# Patient Record
Sex: FEMALE | Race: AMERICAN INDIAN OR ALASKA NATIVE | NOT HISPANIC OR LATINO
[De-identification: names, ages, dates, MRNs, and addresses within clinical notes are randomized per-mention and may not be internally consistent; named-entity substitution may affect disease eponyms.]

---

## 2020-12-01 ENCOUNTER — APPOINTMENT (OUTPATIENT)
Dept: HUMAN REPRODUCTION | Facility: CLINIC | Age: 35
End: 2020-12-01
Payer: COMMERCIAL

## 2020-12-01 PROCEDURE — 99203 OFFICE O/P NEW LOW 30 MIN: CPT | Mod: 95

## 2021-01-29 ENCOUNTER — APPOINTMENT (OUTPATIENT)
Dept: HUMAN REPRODUCTION | Facility: CLINIC | Age: 36
End: 2021-01-29
Payer: COMMERCIAL

## 2021-01-29 PROCEDURE — 99213 OFFICE O/P EST LOW 20 MIN: CPT | Mod: 25

## 2021-01-29 PROCEDURE — 76830 TRANSVAGINAL US NON-OB: CPT

## 2021-01-29 PROCEDURE — 36415 COLL VENOUS BLD VENIPUNCTURE: CPT

## 2021-01-29 PROCEDURE — 99072 ADDL SUPL MATRL&STAF TM PHE: CPT

## 2021-02-03 ENCOUNTER — TRANSCRIPTION ENCOUNTER (OUTPATIENT)
Age: 36
End: 2021-02-03

## 2021-02-22 ENCOUNTER — APPOINTMENT (OUTPATIENT)
Dept: HUMAN REPRODUCTION | Facility: CLINIC | Age: 36
End: 2021-02-22
Payer: COMMERCIAL

## 2021-02-22 PROCEDURE — 99213 OFFICE O/P EST LOW 20 MIN: CPT | Mod: 95

## 2021-03-02 ENCOUNTER — APPOINTMENT (OUTPATIENT)
Dept: HUMAN REPRODUCTION | Facility: CLINIC | Age: 36
End: 2021-03-02

## 2021-03-04 ENCOUNTER — APPOINTMENT (OUTPATIENT)
Dept: RADIOLOGY | Facility: HOSPITAL | Age: 36
End: 2021-03-04

## 2021-03-04 ENCOUNTER — OUTPATIENT (OUTPATIENT)
Dept: OUTPATIENT SERVICES | Facility: HOSPITAL | Age: 36
LOS: 1 days | End: 2021-03-04
Payer: COMMERCIAL

## 2021-03-04 ENCOUNTER — RESULT REVIEW (OUTPATIENT)
Age: 36
End: 2021-03-04

## 2021-03-04 ENCOUNTER — APPOINTMENT (OUTPATIENT)
Dept: HUMAN REPRODUCTION | Facility: CLINIC | Age: 36
End: 2021-03-04
Payer: COMMERCIAL

## 2021-03-04 DIAGNOSIS — N97.9 FEMALE INFERTILITY, UNSPECIFIED: ICD-10-CM

## 2021-03-04 PROCEDURE — 74740 X-RAY FEMALE GENITAL TRACT: CPT

## 2021-03-04 PROCEDURE — 58340 CATHETER FOR HYSTEROGRAPHY: CPT

## 2021-03-04 PROCEDURE — 99214 OFFICE O/P EST MOD 30 MIN: CPT | Mod: 25

## 2021-03-04 PROCEDURE — 74740 X-RAY FEMALE GENITAL TRACT: CPT | Mod: 26,59

## 2021-03-08 ENCOUNTER — APPOINTMENT (OUTPATIENT)
Dept: HUMAN REPRODUCTION | Facility: CLINIC | Age: 36
End: 2021-03-08
Payer: COMMERCIAL

## 2021-03-08 PROCEDURE — 99214 OFFICE O/P EST MOD 30 MIN: CPT | Mod: 95

## 2021-03-11 ENCOUNTER — TRANSCRIPTION ENCOUNTER (OUTPATIENT)
Age: 36
End: 2021-03-11

## 2021-04-06 ENCOUNTER — APPOINTMENT (OUTPATIENT)
Dept: ULTRASOUND IMAGING | Facility: HOSPITAL | Age: 36
End: 2021-04-06
Payer: COMMERCIAL

## 2021-04-06 ENCOUNTER — OUTPATIENT (OUTPATIENT)
Dept: OUTPATIENT SERVICES | Facility: HOSPITAL | Age: 36
LOS: 1 days | End: 2021-04-06
Payer: COMMERCIAL

## 2021-04-06 ENCOUNTER — RESULT REVIEW (OUTPATIENT)
Age: 36
End: 2021-04-06

## 2021-04-06 DIAGNOSIS — N97.9 FEMALE INFERTILITY, UNSPECIFIED: ICD-10-CM

## 2021-04-06 PROCEDURE — 76831 ECHO EXAM UTERUS: CPT | Mod: 26

## 2021-04-06 PROCEDURE — 76831 ECHO EXAM UTERUS: CPT

## 2021-04-06 PROCEDURE — 58340 CATHETER FOR HYSTEROGRAPHY: CPT

## 2021-04-13 ENCOUNTER — APPOINTMENT (OUTPATIENT)
Dept: HUMAN REPRODUCTION | Facility: CLINIC | Age: 36
End: 2021-04-13
Payer: COMMERCIAL

## 2021-04-13 PROCEDURE — 99213 OFFICE O/P EST LOW 20 MIN: CPT | Mod: 95

## 2021-05-09 ENCOUNTER — FORM ENCOUNTER (OUTPATIENT)
Age: 36
End: 2021-05-09

## 2021-05-10 ENCOUNTER — FORM ENCOUNTER (OUTPATIENT)
Age: 36
End: 2021-05-10

## 2021-05-11 ENCOUNTER — APPOINTMENT (OUTPATIENT)
Dept: OBGYN | Facility: CLINIC | Age: 36
End: 2021-05-11
Payer: COMMERCIAL

## 2021-05-11 PROCEDURE — 99072 ADDL SUPL MATRL&STAF TM PHE: CPT

## 2021-05-11 PROCEDURE — 99243 OFF/OP CNSLTJ NEW/EST LOW 30: CPT

## 2021-07-21 ENCOUNTER — APPOINTMENT (OUTPATIENT)
Dept: OBGYN | Facility: CLINIC | Age: 36
End: 2021-07-21

## 2021-08-10 ENCOUNTER — OUTPATIENT (OUTPATIENT)
Dept: OUTPATIENT SERVICES | Facility: HOSPITAL | Age: 36
LOS: 1 days | End: 2021-08-10
Payer: COMMERCIAL

## 2021-08-10 VITALS
SYSTOLIC BLOOD PRESSURE: 114 MMHG | RESPIRATION RATE: 16 BRPM | OXYGEN SATURATION: 100 % | TEMPERATURE: 98 F | WEIGHT: 143.96 LBS | HEIGHT: 65 IN | DIASTOLIC BLOOD PRESSURE: 78 MMHG | HEART RATE: 71 BPM

## 2021-08-10 DIAGNOSIS — Z11.52 ENCOUNTER FOR SCREENING FOR COVID-19: ICD-10-CM

## 2021-08-10 DIAGNOSIS — N84.0 POLYP OF CORPUS UTERI: ICD-10-CM

## 2021-08-10 DIAGNOSIS — E03.9 HYPOTHYROIDISM, UNSPECIFIED: ICD-10-CM

## 2021-08-10 LAB
ANION GAP SERPL CALC-SCNC: 13 MMOL/L — SIGNIFICANT CHANGE UP (ref 5–17)
BLD GP AB SCN SERPL QL: NEGATIVE — SIGNIFICANT CHANGE UP
BUN SERPL-MCNC: 9 MG/DL — SIGNIFICANT CHANGE UP (ref 7–23)
CALCIUM SERPL-MCNC: 9.5 MG/DL — SIGNIFICANT CHANGE UP (ref 8.4–10.5)
CHLORIDE SERPL-SCNC: 105 MMOL/L — SIGNIFICANT CHANGE UP (ref 96–108)
CO2 SERPL-SCNC: 23 MMOL/L — SIGNIFICANT CHANGE UP (ref 22–31)
CREAT SERPL-MCNC: 0.61 MG/DL — SIGNIFICANT CHANGE UP (ref 0.5–1.3)
GLUCOSE SERPL-MCNC: 94 MG/DL — SIGNIFICANT CHANGE UP (ref 70–99)
HCT VFR BLD CALC: 40.9 % — SIGNIFICANT CHANGE UP (ref 34.5–45)
HGB BLD-MCNC: 12.5 G/DL — SIGNIFICANT CHANGE UP (ref 11.5–15.5)
MCHC RBC-ENTMCNC: 24.8 PG — LOW (ref 27–34)
MCHC RBC-ENTMCNC: 30.6 GM/DL — LOW (ref 32–36)
MCV RBC AUTO: 81.2 FL — SIGNIFICANT CHANGE UP (ref 80–100)
NRBC # BLD: 0 /100 WBCS — SIGNIFICANT CHANGE UP (ref 0–0)
PLATELET # BLD AUTO: 274 K/UL — SIGNIFICANT CHANGE UP (ref 150–400)
POTASSIUM SERPL-MCNC: 4.2 MMOL/L — SIGNIFICANT CHANGE UP (ref 3.5–5.3)
POTASSIUM SERPL-SCNC: 4.2 MMOL/L — SIGNIFICANT CHANGE UP (ref 3.5–5.3)
RBC # BLD: 5.04 M/UL — SIGNIFICANT CHANGE UP (ref 3.8–5.2)
RBC # FLD: 15.3 % — HIGH (ref 10.3–14.5)
RH IG SCN BLD-IMP: POSITIVE — SIGNIFICANT CHANGE UP
SARS-COV-2 RNA SPEC QL NAA+PROBE: SIGNIFICANT CHANGE UP
SODIUM SERPL-SCNC: 141 MMOL/L — SIGNIFICANT CHANGE UP (ref 135–145)
WBC # BLD: 9.12 K/UL — SIGNIFICANT CHANGE UP (ref 3.8–10.5)
WBC # FLD AUTO: 9.12 K/UL — SIGNIFICANT CHANGE UP (ref 3.8–10.5)

## 2021-08-10 PROCEDURE — 80048 BASIC METABOLIC PNL TOTAL CA: CPT

## 2021-08-10 PROCEDURE — 85027 COMPLETE CBC AUTOMATED: CPT

## 2021-08-10 PROCEDURE — U0003: CPT

## 2021-08-10 PROCEDURE — 86850 RBC ANTIBODY SCREEN: CPT

## 2021-08-10 PROCEDURE — U0005: CPT

## 2021-08-10 PROCEDURE — C9803: CPT

## 2021-08-10 PROCEDURE — G0463: CPT

## 2021-08-10 PROCEDURE — 86900 BLOOD TYPING SEROLOGIC ABO: CPT

## 2021-08-10 PROCEDURE — 86901 BLOOD TYPING SEROLOGIC RH(D): CPT

## 2021-08-10 RX ORDER — LIDOCAINE HCL 20 MG/ML
0.2 VIAL (ML) INJECTION ONCE
Refills: 0 | Status: DISCONTINUED | OUTPATIENT
Start: 2021-08-12 | End: 2021-08-12

## 2021-08-10 RX ORDER — SODIUM CHLORIDE 9 MG/ML
3 INJECTION INTRAMUSCULAR; INTRAVENOUS; SUBCUTANEOUS EVERY 8 HOURS
Refills: 0 | Status: DISCONTINUED | OUTPATIENT
Start: 2021-08-12 | End: 2021-08-12

## 2021-08-10 NOTE — H&P PST ADULT - HISTORY OF PRESENT ILLNESS
35 yr old female with PMH of  35 yr old female (LMP: 21)  with PMH of Hypothyroidism otherwise healthy. Imaging reveals uterine polyp. Pt denies dysmenorrhea or menorrhagia at this time. Pt evaluated by Dr. Hearn for a scheduled D&C, Diagnostic hysteroscopy, polypectomy on 21. Pt denies recent travel or sick contact.     **Covid swab on 8/10/21 at [facility]

## 2021-08-10 NOTE — H&P PST ADULT - NEGATIVE FEMALE-SPECIFIC SYMPTOMS
no irregular menses/no vaginal discharge/no dysmenorrhea/no menorrhagia/no spotting/no abnormal vaginal bleeding

## 2021-08-10 NOTE — H&P PST ADULT - ATTENDING COMMENTS
Pt consented for  D&C, Diagnostic hysteroscopy, polypectomy, poss operative hysteroscopy. All questions answered.

## 2021-08-10 NOTE — H&P PST ADULT - PROBLEM SELECTOR PLAN 1
-scheduled D&C diagnostic hysteroscopy, polypectomy on 8/12/21  -preop instructions given  -labs: CBC, BMP, T&S done in PST  -DOS: urine preg, ABO

## 2021-08-11 ENCOUNTER — TRANSCRIPTION ENCOUNTER (OUTPATIENT)
Age: 36
End: 2021-08-11

## 2021-08-12 ENCOUNTER — APPOINTMENT (OUTPATIENT)
Dept: OBGYN | Facility: HOSPITAL | Age: 36
End: 2021-08-12

## 2021-08-12 ENCOUNTER — RESULT REVIEW (OUTPATIENT)
Age: 36
End: 2021-08-12

## 2021-08-12 ENCOUNTER — OUTPATIENT (OUTPATIENT)
Dept: OUTPATIENT SERVICES | Facility: HOSPITAL | Age: 36
LOS: 1 days | End: 2021-08-12
Payer: COMMERCIAL

## 2021-08-12 VITALS
WEIGHT: 143.96 LBS | DIASTOLIC BLOOD PRESSURE: 79 MMHG | OXYGEN SATURATION: 100 % | HEART RATE: 78 BPM | RESPIRATION RATE: 16 BRPM | TEMPERATURE: 99 F | HEIGHT: 65 IN | SYSTOLIC BLOOD PRESSURE: 118 MMHG

## 2021-08-12 VITALS
RESPIRATION RATE: 16 BRPM | SYSTOLIC BLOOD PRESSURE: 115 MMHG | HEART RATE: 92 BPM | DIASTOLIC BLOOD PRESSURE: 64 MMHG | OXYGEN SATURATION: 100 % | TEMPERATURE: 97 F

## 2021-08-12 DIAGNOSIS — N84.0 POLYP OF CORPUS UTERI: ICD-10-CM

## 2021-08-12 LAB
BLD GP AB SCN SERPL QL: NEGATIVE — SIGNIFICANT CHANGE UP
HCG UR QL: NEGATIVE — SIGNIFICANT CHANGE UP
RH IG SCN BLD-IMP: POSITIVE — SIGNIFICANT CHANGE UP

## 2021-08-12 PROCEDURE — 86850 RBC ANTIBODY SCREEN: CPT

## 2021-08-12 PROCEDURE — 86900 BLOOD TYPING SEROLOGIC ABO: CPT

## 2021-08-12 PROCEDURE — 86901 BLOOD TYPING SEROLOGIC RH(D): CPT

## 2021-08-12 PROCEDURE — 58558 HYSTEROSCOPY BIOPSY: CPT

## 2021-08-12 PROCEDURE — 88305 TISSUE EXAM BY PATHOLOGIST: CPT

## 2021-08-12 PROCEDURE — 81025 URINE PREGNANCY TEST: CPT

## 2021-08-12 PROCEDURE — 88305 TISSUE EXAM BY PATHOLOGIST: CPT | Mod: 26

## 2021-08-12 RX ORDER — ONDANSETRON 8 MG/1
4 TABLET, FILM COATED ORAL ONCE
Refills: 0 | Status: DISCONTINUED | OUTPATIENT
Start: 2021-08-12 | End: 2021-08-12

## 2021-08-12 RX ORDER — SODIUM CHLORIDE 9 MG/ML
1000 INJECTION, SOLUTION INTRAVENOUS
Refills: 0 | Status: DISCONTINUED | OUTPATIENT
Start: 2021-08-12 | End: 2021-08-26

## 2021-08-12 RX ORDER — LEVOTHYROXINE SODIUM 125 MCG
1 TABLET ORAL
Qty: 0 | Refills: 0 | DISCHARGE

## 2021-08-12 RX ORDER — CELECOXIB 200 MG/1
200 CAPSULE ORAL ONCE
Refills: 0 | Status: COMPLETED | OUTPATIENT
Start: 2021-08-12 | End: 2021-08-12

## 2021-08-12 RX ORDER — HYDROMORPHONE HYDROCHLORIDE 2 MG/ML
0.5 INJECTION INTRAMUSCULAR; INTRAVENOUS; SUBCUTANEOUS
Refills: 0 | Status: DISCONTINUED | OUTPATIENT
Start: 2021-08-12 | End: 2021-08-12

## 2021-08-12 RX ORDER — FAMOTIDINE 10 MG/ML
20 INJECTION INTRAVENOUS ONCE
Refills: 0 | Status: COMPLETED | OUTPATIENT
Start: 2021-08-12 | End: 2021-08-12

## 2021-08-12 RX ORDER — SODIUM CHLORIDE 9 MG/ML
1000 INJECTION, SOLUTION INTRAVENOUS
Refills: 0 | Status: DISCONTINUED | OUTPATIENT
Start: 2021-08-12 | End: 2021-08-12

## 2021-08-12 RX ORDER — ACETAMINOPHEN 500 MG
975 TABLET ORAL ONCE
Refills: 0 | Status: COMPLETED | OUTPATIENT
Start: 2021-08-12 | End: 2021-08-12

## 2021-08-12 RX ADMIN — CELECOXIB 200 MILLIGRAM(S): 200 CAPSULE ORAL at 09:14

## 2021-08-12 RX ADMIN — Medication 975 MILLIGRAM(S): at 09:14

## 2021-08-12 RX ADMIN — SODIUM CHLORIDE 125 MILLILITER(S): 9 INJECTION, SOLUTION INTRAVENOUS at 15:49

## 2021-08-12 RX ADMIN — FAMOTIDINE 20 MILLIGRAM(S): 10 INJECTION INTRAVENOUS at 09:14

## 2021-08-12 NOTE — ASU DISCHARGE PLAN (ADULT/PEDIATRIC) - CALL YOUR DOCTOR IF YOU HAVE ANY OF THE FOLLOWING:
Bleeding that does not stop/Swelling that gets worse/Pain not relieved by Medications/Fever greater than (need to indicate Fahrenheit or Celsius)/Wound/Surgical Site with redness, or foul smelling discharge or pus/Unable to urinate/Excessive diarrhea/Inability to tolerate liquids or foods

## 2021-08-12 NOTE — PROGRESS NOTE ADULT - SUBJECTIVE AND OBJECTIVE BOX
POST-OP CHECK  PA Note    Allergies    No Known Allergies    Intolerances        S: Pt awake and alert resting comfortaby in bed     Pain controlled. Pt denies N/V, SOB, CP, palpitations.   neg Flatus  neg PO     O:   T(C): 36.4 (08-12-21 @ 15:10), Max: 36.4 (08-12-21 @ 15:10)  HR: 84 (08-12-21 @ 16:00) (78 - 88)  BP: 126/76 (08-12-21 @ 16:00) (121/75 - 131/68)  RR: 16 (08-12-21 @ 16:00) (15 - 17)  SpO2: 100% (08-12-21 @ 16:00) (100% - 100%)  Wt(kg): --  I&O's Summary                       OR           PACU  (I)                              IVF LR@125  (O)  EBL    CV: RRR  Lungs: CTA B/L  Abd: +BS, soft, appropriately tender  Ext: SCDs in place, Neg Homans B/L

## 2021-08-12 NOTE — PROGRESS NOTE ADULT - ASSESSMENT
A/P: 35y Female S/P D&C hysterocscopy polypectomy 300/10  with PMHx of   PAST MEDICAL & SURGICAL HISTORY:  Hypothyroidism    No significant past surgical history        -Neuro - AAO x 3, Pain well controlled   -Heme - hemodynamically stable  -CV - asymptomatic, CBC  -Pulm - encourage IS, O2sat   -GI - Diet-  Regular  Advance as Toelrated  -DVT prophylaxis - SCDs in place, encourage ambulation  -Meds:   HYDROmorphone  Injectable 0.5 milliGRAM(s) IV Push every 10 minutes PRN  lactated ringers. 1000 milliLiter(s) IV Continuous <Continuous>  ondansetron Injectable 4 milliGRAM(s) IV Push once PRN    -Dispo: stable for discharge from PACU, once meeting all PACU milestones

## 2021-08-12 NOTE — BRIEF OPERATIVE NOTE - OPERATION/FINDINGS
Normal external female genitalia  Anteverted uterus on EUA, 8 weeks size.   Ostia visualized b/l.  Left lateral polyp within normal uterine cavity with proliferative endometrial tissue.

## 2021-08-12 NOTE — ASU DISCHARGE PLAN (ADULT/PEDIATRIC) - ACTIVITY LEVEL
No excercise/No sports/gym/Elevate extremity/Nothing per rectum/Nothing per vagina/No tub baths/No douching/No tampons/No intercourse

## 2021-08-12 NOTE — ASU DISCHARGE PLAN (ADULT/PEDIATRIC) - ASU DC SPECIAL INSTRUCTIONSFT
Postoperative Instructions      Pain control     For pain control, take the followin. Motrin 600mg four times a day, take with food.  2. Add Tylenol 975 four times a day, alternated with motrin.    Motrin and Tylenol can be obtained over the counter.    Postoperative restrictions   Do not drive or make important decisions for 24 hours after anesthesia. Nothing in the vagina (tampons, sexual intercourse), no tub baths, pools or hot tubs for 2 weeks (showers are ok!). No lifting anything heavier than 15 lbs, no strenuous exercise for 2 weeks after surgery.        Vaginal bleeding   Spotting and intermittent passage of blood clots per vagina is normal in first few weeks after surgery.  If you are soaking 1 pad per hour, that is NOT normal and you should notify Dr. Hearn office and seek medical attention right away.      Signs of Infection    Call the office and/or come to the emergency room for any of the following: foul smelling vaginal discharge, fever over 100.4F, abdominal pain or cramping that does not get better with over the counter medications, nausea/vomiting (especially if you become unable to tolerate oral intake), inability to urinate. Any of these could be signs that you may be developing an infection requiring antibiotics.      Follow Up  Call Dr. Hearn office to schedule a postoperative appointment in 2 weeks.

## 2021-08-12 NOTE — BRIEF OPERATIVE NOTE - NSICDXBRIEFPROCEDURE_GEN_ALL_CORE_FT
PROCEDURES:  Hysteroscopy with dilation and curettage of uterus 12-Aug-2021 15:31:03  Melany Carlson  Polypectomy, uterus 12-Aug-2021 15:31:13  Melany Carlson

## 2021-08-12 NOTE — ASU DISCHARGE PLAN (ADULT/PEDIATRIC) - CARE PROVIDER_API CALL
Boom Hearn)  Obstetrics and Gynecology  [address] [place] [address], [address]  [place] [address]  Phone: [phone]  Fax: [phone]  Follow Up Time: 2 weeks

## 2021-08-18 ENCOUNTER — FORM ENCOUNTER (OUTPATIENT)
Age: 36
End: 2021-08-18

## 2021-08-18 LAB — SURGICAL PATHOLOGY STUDY: SIGNIFICANT CHANGE UP

## 2021-08-19 PROBLEM — E03.9 HYPOTHYROIDISM, UNSPECIFIED: Chronic | Status: ACTIVE | Noted: 2021-08-10

## 2021-08-31 ENCOUNTER — FORM ENCOUNTER (OUTPATIENT)
Age: 36
End: 2021-08-31

## 2021-09-01 ENCOUNTER — FORM ENCOUNTER (OUTPATIENT)
Age: 36
End: 2021-09-01

## 2021-09-01 ENCOUNTER — APPOINTMENT (OUTPATIENT)
Dept: OBGYN | Facility: CLINIC | Age: 36
End: 2021-09-01
Payer: COMMERCIAL

## 2021-09-01 PROCEDURE — 99212 OFFICE O/P EST SF 10 MIN: CPT

## 2021-09-20 ENCOUNTER — APPOINTMENT (OUTPATIENT)
Dept: HUMAN REPRODUCTION | Facility: CLINIC | Age: 36
End: 2021-09-20
Payer: COMMERCIAL

## 2021-09-20 PROCEDURE — 99214 OFFICE O/P EST MOD 30 MIN: CPT | Mod: 95

## 2021-10-01 ENCOUNTER — APPOINTMENT (OUTPATIENT)
Dept: HUMAN REPRODUCTION | Facility: CLINIC | Age: 36
End: 2021-10-01
Payer: COMMERCIAL

## 2021-10-01 PROCEDURE — 99213 OFFICE O/P EST LOW 20 MIN: CPT | Mod: 25

## 2021-10-01 PROCEDURE — 76830 TRANSVAGINAL US NON-OB: CPT

## 2021-10-01 PROCEDURE — 36415 COLL VENOUS BLD VENIPUNCTURE: CPT

## 2022-01-11 ENCOUNTER — APPOINTMENT (OUTPATIENT)
Dept: HUMAN REPRODUCTION | Facility: CLINIC | Age: 37
End: 2022-01-11
Payer: COMMERCIAL

## 2022-01-11 PROCEDURE — 99214 OFFICE O/P EST MOD 30 MIN: CPT | Mod: 95

## 2022-01-12 ENCOUNTER — NON-APPOINTMENT (OUTPATIENT)
Age: 37
End: 2022-01-12

## 2022-01-12 DIAGNOSIS — Z98.890 OTHER SPECIFIED POSTPROCEDURAL STATES: ICD-10-CM

## 2022-01-12 DIAGNOSIS — N84.0 POLYP OF CORPUS UTERI: ICD-10-CM

## 2022-01-18 ENCOUNTER — APPOINTMENT (OUTPATIENT)
Dept: OBGYN | Facility: CLINIC | Age: 37
End: 2022-01-18
Payer: COMMERCIAL

## 2022-01-18 VITALS
SYSTOLIC BLOOD PRESSURE: 127 MMHG | BODY MASS INDEX: 24.98 KG/M2 | WEIGHT: 141 LBS | HEIGHT: 63 IN | DIASTOLIC BLOOD PRESSURE: 80 MMHG

## 2022-01-18 DIAGNOSIS — Z01.419 ENCOUNTER FOR GYNECOLOGICAL EXAMINATION (GENERAL) (ROUTINE) W/OUT ABNORMAL FINDINGS: ICD-10-CM

## 2022-01-18 PROCEDURE — 99395 PREV VISIT EST AGE 18-39: CPT

## 2022-01-18 NOTE — END OF VISIT
[FreeTextEntry3] : I, Adrianna Saldaña, acted solely as a scribe for Dr. Whit Cam MD., on 01/18/2022.\par \par All medical record entries made by the scribe were at my, Dr. Whit Cam MD., direction and personally dictated by me on 01/18/2022. I have personally reviewed the chart and agree that the record accurately reflects my personal performance of the history, physical exam, assessment and plan.\par

## 2022-01-18 NOTE — HISTORY OF PRESENT ILLNESS
[FreeTextEntry1] : MARLY GARCIA is a 36 year old presenting for annual. Pt is doing well and has no complaints. Still following up with reproductive specialist, possibly going with IUI.

## 2022-01-18 NOTE — PLAN
[FreeTextEntry1] : MARLY GARCIA is a 36 year old presenting for annual.\par -Pap/HPV was done today \par -BSE\par -RTO 1 year

## 2022-01-19 LAB — HPV HIGH+LOW RISK DNA PNL CVX: NOT DETECTED

## 2022-01-25 ENCOUNTER — APPOINTMENT (OUTPATIENT)
Dept: HUMAN REPRODUCTION | Facility: CLINIC | Age: 37
End: 2022-01-25
Payer: COMMERCIAL

## 2022-01-25 PROCEDURE — 99214 OFFICE O/P EST MOD 30 MIN: CPT | Mod: 25

## 2022-01-25 PROCEDURE — 36415 COLL VENOUS BLD VENIPUNCTURE: CPT

## 2022-01-26 LAB — CYTOLOGY CVX/VAG DOC THIN PREP: ABNORMAL

## 2022-03-25 ENCOUNTER — APPOINTMENT (OUTPATIENT)
Dept: OBGYN | Facility: CLINIC | Age: 37
End: 2022-03-25
Payer: COMMERCIAL

## 2022-03-25 VITALS
WEIGHT: 140 LBS | DIASTOLIC BLOOD PRESSURE: 80 MMHG | HEIGHT: 63 IN | BODY MASS INDEX: 24.8 KG/M2 | SYSTOLIC BLOOD PRESSURE: 123 MMHG

## 2022-03-25 PROCEDURE — 99213 OFFICE O/P EST LOW 20 MIN: CPT | Mod: 25

## 2022-03-25 PROCEDURE — 76857 US EXAM PELVIC LIMITED: CPT

## 2022-03-25 PROCEDURE — 36415 COLL VENOUS BLD VENIPUNCTURE: CPT

## 2022-03-26 LAB
HCG SERPL-MCNC: ABNORMAL MIU/ML
PROGEST SERPL-MCNC: 26.4 NG/ML

## 2022-03-26 NOTE — HISTORY OF PRESENT ILLNESS
[N] : Patient does not use contraception [Y] : Positive pregnancy history [LMPDate] : 2/11/22 [PGxTotal] : 1 [PGHxFullTerm] : 0 [PGHxAbortions] : 1 [[facility]Living] : 0 [PGHxABSpont] : 1 [N/A] : pregnancy not applicable [Thyroid disorder] : thyroid disorder [TextBox_6] : 2/11/22 [TextBox_13] : 22-11 [Light Bleeding] : light bleeding [Regular Cycle Intervals] : periods have been regular [HCG+: ___(Date)] : a positive HCG was recorded on [unfilled] [FreeTextEntry1] : 2/11/22 [Currently Active] : currently active [Men] : men [No] : No [Patient refuses STI testing] : Patient refuses STI testing

## 2022-03-26 NOTE — END OF VISIT
[FreeTextEntry3] : I, Maricel Braden, acted as a scribe on behalf of Taya Garcia NP on 03/25/2022.\par \par All medical entries made by the scribe were at my, Taya Garcia NP, direction and personally dictated by me on 03/25/2022 . I have reviewed the chart and agree that the record accurately reflects my personal performance of the history, physical exam, assessment and plan. I have also personally directed, reviewed, and agreed with the chart.

## 2022-03-26 NOTE — PHYSICAL EXAM
[Appropriately responsive] : appropriately responsive [Alert] : alert [No Acute Distress] : no acute distress [No Lymphadenopathy] : no lymphadenopathy [Soft] : soft [Non-tender] : non-tender [Non-distended] : non-distended [No HSM] : No HSM [No Lesions] : no lesions [No Mass] : no mass [Oriented x3] : oriented x3 [Labia Majora] : normal [Labia Minora] : normal [Normal] : normal [Uterine Adnexae] : normal [Chaperone Declined] : Patient declined chaperone [Scant] : There was scant vaginal bleeding [Declined] : Patient declined rectal exam [FreeTextEntry4] : brown d/c c/w old blood

## 2022-03-26 NOTE — PLAN
[FreeTextEntry1] : 35 y/o LMP 2022   presents for an acute office visit today regarding vaginal bleeding on Wednesday 3/23 and positive urine pregnancy test.  \par \par Early iup with prior bleeding\par -vaginal bleeding now resolved/ brown discharge noted c/w old blood. \par - Intrauterine pregnancy was confirmed today at 6 weeks and 0 days per LMP and early sono today; TVUS shows single small GS at fundal region with +YS and small FP measuring 6w0d by CRL. No obvious FH today but +flicker noted.  \par -early pregnancy precautions reviewed. \par - Beta-HCG, progesterone, and type in screen were collected today in office. \par -pelvic rest advised\par \par  Follow up with Dr. Cam for regularly scheduled amenorrhea visit/confirm viable pregnancy\par \par During this visit 30 minutes were spent face-to-face with greater than 50% of this time dedicated to counseling.\par \par \par

## 2022-03-26 NOTE — COUNSELING
[Preconception Care/ Fertility options] : preconception care, fertility options [Pregnancy Options] : pregnancy options [Vitamins/Supplements] : vitamins/supplements [Confidentiality] : confidentiality [Lab Results] : lab results [Other ___] : [unfilled]

## 2022-03-28 LAB — ABO + RH PNL BLD: NORMAL

## 2022-04-04 ENCOUNTER — LABORATORY RESULT (OUTPATIENT)
Age: 37
End: 2022-04-04

## 2022-04-04 ENCOUNTER — APPOINTMENT (OUTPATIENT)
Dept: OBGYN | Facility: CLINIC | Age: 37
End: 2022-04-04
Payer: COMMERCIAL

## 2022-04-04 VITALS
SYSTOLIC BLOOD PRESSURE: 122 MMHG | HEIGHT: 63 IN | DIASTOLIC BLOOD PRESSURE: 78 MMHG | WEIGHT: 141 LBS | BODY MASS INDEX: 24.98 KG/M2

## 2022-04-04 PROCEDURE — 0500F INITIAL PRENATAL CARE VISIT: CPT

## 2022-04-04 PROCEDURE — 36415 COLL VENOUS BLD VENIPUNCTURE: CPT

## 2022-04-04 PROCEDURE — 76817 TRANSVAGINAL US OBSTETRIC: CPT

## 2022-04-05 ENCOUNTER — NON-APPOINTMENT (OUTPATIENT)
Age: 37
End: 2022-04-05

## 2022-04-05 LAB
ABO + RH PNL BLD: NORMAL
B19V IGG SER QL IA: 3.93 INDEX
B19V IGG+IGM SER-IMP: NORMAL
B19V IGG+IGM SER-IMP: POSITIVE
B19V IGM FLD-ACNC: 0.54 INDEX
B19V IGM SER-ACNC: NEGATIVE
BASOPHILS # BLD AUTO: 0.06 K/UL
BASOPHILS NFR BLD AUTO: 0.6 %
C TRACH RRNA SPEC QL NAA+PROBE: NOT DETECTED
EOSINOPHIL # BLD AUTO: 0.09 K/UL
EOSINOPHIL NFR BLD AUTO: 0.9 %
ESTIMATED AVERAGE GLUCOSE: 105 MG/DL
HBA1C MFR BLD HPLC: 5.3 %
HBV SURFACE AG SER QL: NONREACTIVE
HCT VFR BLD CALC: 40.9 %
HGB A MFR BLD: 97.4 %
HGB A2 MFR BLD: 2.6 %
HGB BLD-MCNC: 12.3 G/DL
HGB FRACT BLD-IMP: NORMAL
HIV1+2 AB SPEC QL IA.RAPID: NONREACTIVE
IMM GRANULOCYTES NFR BLD AUTO: 0.5 %
LEAD BLD-MCNC: <1 UG/DL
LYMPHOCYTES # BLD AUTO: 2.24 K/UL
LYMPHOCYTES NFR BLD AUTO: 23.3 %
MAN DIFF?: NORMAL
MCHC RBC-ENTMCNC: 24.8 PG
MCHC RBC-ENTMCNC: 30.1 GM/DL
MCV RBC AUTO: 82.6 FL
MEV IGG FLD QL IA: 97.5 AU/ML
MEV IGG+IGM SER-IMP: POSITIVE
MONOCYTES # BLD AUTO: 0.62 K/UL
MONOCYTES NFR BLD AUTO: 6.5 %
MUV AB SER-ACNC: POSITIVE
MUV IGG SER QL IA: 159 AU/ML
N GONORRHOEA RRNA SPEC QL NAA+PROBE: NOT DETECTED
NEUTROPHILS # BLD AUTO: 6.55 K/UL
NEUTROPHILS NFR BLD AUTO: 68.2 %
PLATELET # BLD AUTO: 298 K/UL
RBC # BLD: 4.95 M/UL
RBC # FLD: 16.1 %
RUBV IGG FLD-ACNC: 16 INDEX
RUBV IGG FLD-ACNC: 16 INDEX
RUBV IGG SER-IMP: POSITIVE
RUBV IGG SER-IMP: POSITIVE
SOURCE AMPLIFICATION: NORMAL
T PALLIDUM AB SER QL IA: NEGATIVE
VZV AB TITR SER: POSITIVE
VZV IGG SER IF-ACNC: 808.8 INDEX
WBC # FLD AUTO: 9.61 K/UL

## 2022-04-06 ENCOUNTER — NON-APPOINTMENT (OUTPATIENT)
Age: 37
End: 2022-04-06

## 2022-04-06 LAB
25(OH)D3 SERPL-MCNC: 21.7 NG/ML
BACTERIA UR CULT: NORMAL
TSH SERPL-ACNC: 9.91 UIU/ML

## 2022-05-05 ENCOUNTER — APPOINTMENT (OUTPATIENT)
Dept: OBGYN | Facility: CLINIC | Age: 37
End: 2022-05-05
Payer: COMMERCIAL

## 2022-05-05 DIAGNOSIS — D64.9 ANEMIA, UNSPECIFIED: ICD-10-CM

## 2022-05-05 PROCEDURE — 0502F SUBSEQUENT PRENATAL CARE: CPT

## 2022-05-05 PROCEDURE — 36415 COLL VENOUS BLD VENIPUNCTURE: CPT

## 2022-05-11 ENCOUNTER — APPOINTMENT (OUTPATIENT)
Dept: OBGYN | Facility: CLINIC | Age: 37
End: 2022-05-11
Payer: COMMERCIAL

## 2022-05-11 ENCOUNTER — ASOB RESULT (OUTPATIENT)
Age: 37
End: 2022-05-11

## 2022-05-11 VITALS
SYSTOLIC BLOOD PRESSURE: 121 MMHG | HEIGHT: 63 IN | OXYGEN SATURATION: 100 % | HEART RATE: 85 BPM | WEIGHT: 146 LBS | BODY MASS INDEX: 25.87 KG/M2 | DIASTOLIC BLOOD PRESSURE: 72 MMHG | RESPIRATION RATE: 15 BRPM

## 2022-05-11 PROCEDURE — 36415 COLL VENOUS BLD VENIPUNCTURE: CPT

## 2022-05-11 PROCEDURE — 76813 OB US NUCHAL MEAS 1 GEST: CPT

## 2022-05-11 PROCEDURE — 0502F SUBSEQUENT PRENATAL CARE: CPT

## 2022-05-11 PROCEDURE — 76801 OB US < 14 WKS SINGLE FETUS: CPT | Mod: 59

## 2022-05-18 ENCOUNTER — NON-APPOINTMENT (OUTPATIENT)
Age: 37
End: 2022-05-18

## 2022-05-18 LAB
1ST TRIMESTER DATA: NORMAL
ADDENDUM DOC: NORMAL
AFP PNL SERPL: NORMAL
AFP SERPL-ACNC: NORMAL
CLINICAL BIOCHEMIST REVIEW: NORMAL
FREE BETA HCG 1ST TRIMESTER: NORMAL
Lab: NORMAL
NASAL BONE: PRESENT
NOTES NTD: NORMAL
NT: NORMAL
PAPP-A SERPL-ACNC: NORMAL
TRISOMY 18/3: NORMAL

## 2022-05-19 ENCOUNTER — NON-APPOINTMENT (OUTPATIENT)
Age: 37
End: 2022-05-19

## 2022-05-25 ENCOUNTER — NON-APPOINTMENT (OUTPATIENT)
Age: 37
End: 2022-05-25

## 2022-05-26 LAB
BACTERIA UR CULT: NORMAL
BASOPHILS # BLD AUTO: 0.06 K/UL
BASOPHILS NFR BLD AUTO: 0.5 %
EOSINOPHIL # BLD AUTO: 0.09 K/UL
EOSINOPHIL NFR BLD AUTO: 0.8 %
HCT VFR BLD CALC: 39.4 %
HGB BLD-MCNC: 12.5 G/DL
IMM GRANULOCYTES NFR BLD AUTO: 0.3 %
LYMPHOCYTES # BLD AUTO: 1.77 K/UL
LYMPHOCYTES NFR BLD AUTO: 15.3 %
MAN DIFF?: NORMAL
MCHC RBC-ENTMCNC: 25 PG
MCHC RBC-ENTMCNC: 31.7 GM/DL
MCV RBC AUTO: 78.6 FL
MONOCYTES # BLD AUTO: 0.71 K/UL
MONOCYTES NFR BLD AUTO: 6.1 %
NEUTROPHILS # BLD AUTO: 8.9 K/UL
NEUTROPHILS NFR BLD AUTO: 77 %
PLATELET # BLD AUTO: 266 K/UL
RBC # BLD: 5.01 M/UL
RBC # FLD: 16.3 %
WBC # FLD AUTO: 11.57 K/UL

## 2022-05-27 ENCOUNTER — NON-APPOINTMENT (OUTPATIENT)
Age: 37
End: 2022-05-27

## 2022-06-08 ENCOUNTER — APPOINTMENT (OUTPATIENT)
Dept: OBGYN | Facility: CLINIC | Age: 37
End: 2022-06-08
Payer: COMMERCIAL

## 2022-06-08 PROCEDURE — 0502F SUBSEQUENT PRENATAL CARE: CPT

## 2022-06-08 PROCEDURE — 36415 COLL VENOUS BLD VENIPUNCTURE: CPT

## 2022-06-17 LAB
1ST TRIMESTER DATA: NORMAL
2ND TRIMESTER DATA: NORMAL
AFP PNL SERPL: NORMAL
AFP SERPL-ACNC: NORMAL
AFP SERPL-ACNC: NORMAL
B-HCG FREE SERPL-MCNC: NORMAL
CLINICAL BIOCHEMIST REVIEW: NORMAL
FREE BETA HCG 1ST TRIMESTER: NORMAL
INHIBIN A SERPL-MCNC: NORMAL
NASAL BONE: PRESENT
NOTES NTD: NORMAL
NT: NORMAL
PAPP-A SERPL-ACNC: NORMAL
U ESTRIOL SERPL-SCNC: NORMAL

## 2022-06-27 ENCOUNTER — NON-APPOINTMENT (OUTPATIENT)
Age: 37
End: 2022-06-27

## 2022-07-01 ENCOUNTER — ASOB RESULT (OUTPATIENT)
Age: 37
End: 2022-07-01

## 2022-07-01 ENCOUNTER — NON-APPOINTMENT (OUTPATIENT)
Age: 37
End: 2022-07-01

## 2022-07-01 ENCOUNTER — APPOINTMENT (OUTPATIENT)
Dept: ANTEPARTUM | Facility: CLINIC | Age: 37
End: 2022-07-01

## 2022-07-01 PROCEDURE — 76811 OB US DETAILED SNGL FETUS: CPT

## 2022-07-08 ENCOUNTER — APPOINTMENT (OUTPATIENT)
Dept: ANTEPARTUM | Facility: CLINIC | Age: 37
End: 2022-07-08

## 2022-07-08 ENCOUNTER — ASOB RESULT (OUTPATIENT)
Age: 37
End: 2022-07-08

## 2022-07-08 PROCEDURE — 76816 OB US FOLLOW-UP PER FETUS: CPT

## 2022-07-19 ENCOUNTER — NON-APPOINTMENT (OUTPATIENT)
Age: 37
End: 2022-07-19

## 2022-07-19 ENCOUNTER — APPOINTMENT (OUTPATIENT)
Dept: OBGYN | Facility: CLINIC | Age: 37
End: 2022-07-19

## 2022-07-19 PROCEDURE — 0502F SUBSEQUENT PRENATAL CARE: CPT

## 2022-08-23 ENCOUNTER — APPOINTMENT (OUTPATIENT)
Dept: OBGYN | Facility: CLINIC | Age: 37
End: 2022-08-23

## 2022-08-23 ENCOUNTER — ASOB RESULT (OUTPATIENT)
Age: 37
End: 2022-08-23

## 2022-08-23 ENCOUNTER — NON-APPOINTMENT (OUTPATIENT)
Age: 37
End: 2022-08-23

## 2022-08-23 VITALS
SYSTOLIC BLOOD PRESSURE: 117 MMHG | HEART RATE: 92 BPM | HEIGHT: 63 IN | DIASTOLIC BLOOD PRESSURE: 72 MMHG | BODY MASS INDEX: 26.75 KG/M2 | WEIGHT: 151 LBS

## 2022-08-23 DIAGNOSIS — E03.9 ENDOCRINE, NUTRITIONAL AND METABOLIC DISEASES COMPLICATING PREGNANCY, SECOND TRIMESTER: ICD-10-CM

## 2022-08-23 DIAGNOSIS — O99.282 ENDOCRINE, NUTRITIONAL AND METABOLIC DISEASES COMPLICATING PREGNANCY, SECOND TRIMESTER: ICD-10-CM

## 2022-08-23 DIAGNOSIS — Z13.0 ENCOUNTER FOR SCREENING FOR DISEASES OF THE BLOOD AND BLOOD-FORMING ORGANS AND CERTAIN DISORDERS INVOLVING THE IMMUNE MECHANISM: ICD-10-CM

## 2022-08-23 DIAGNOSIS — Z11.3 ENCOUNTER FOR SCREENING FOR INFECTIONS WITH A PREDOMINANTLY SEXUAL MODE OF TRANSMISSION: ICD-10-CM

## 2022-08-23 LAB
25(OH)D3 SERPL-MCNC: 23.6 NG/ML
BASOPHILS # BLD AUTO: 0.04 K/UL
BASOPHILS NFR BLD AUTO: 0.5 %
EOSINOPHIL # BLD AUTO: 0.08 K/UL
EOSINOPHIL NFR BLD AUTO: 0.9 %
GLUCOSE 1H P 50 G GLC PO SERPL-MCNC: 109 MG/DL
HCT VFR BLD CALC: 35 %
HGB BLD-MCNC: 10.9 G/DL
HIV1+2 AB SPEC QL IA.RAPID: NONREACTIVE
IMM GRANULOCYTES NFR BLD AUTO: 0.7 %
LYMPHOCYTES # BLD AUTO: 1.56 K/UL
LYMPHOCYTES NFR BLD AUTO: 18.2 %
MAN DIFF?: NORMAL
MCHC RBC-ENTMCNC: 25.8 PG
MCHC RBC-ENTMCNC: 31.1 GM/DL
MCV RBC AUTO: 82.7 FL
MONOCYTES # BLD AUTO: 0.46 K/UL
MONOCYTES NFR BLD AUTO: 5.4 %
NEUTROPHILS # BLD AUTO: 6.39 K/UL
NEUTROPHILS NFR BLD AUTO: 74.3 %
PLATELET # BLD AUTO: 262 K/UL
RBC # BLD: 4.23 M/UL
RBC # FLD: 16 %
WBC # FLD AUTO: 8.59 K/UL

## 2022-08-23 PROCEDURE — 76816 OB US FOLLOW-UP PER FETUS: CPT

## 2022-08-23 PROCEDURE — 36415 COLL VENOUS BLD VENIPUNCTURE: CPT

## 2022-08-23 PROCEDURE — 0502F SUBSEQUENT PRENATAL CARE: CPT

## 2022-08-24 LAB
BLD GP AB SCN SERPL QL: NORMAL
HCV AB SER QL: NONREACTIVE
HCV S/CO RATIO: 0.09 S/CO
T PALLIDUM AB SER QL IA: NEGATIVE

## 2022-09-12 ENCOUNTER — NON-APPOINTMENT (OUTPATIENT)
Age: 37
End: 2022-09-12

## 2022-09-13 ENCOUNTER — APPOINTMENT (OUTPATIENT)
Dept: OBGYN | Facility: CLINIC | Age: 37
End: 2022-09-13

## 2022-09-13 DIAGNOSIS — Z23 ENCOUNTER FOR IMMUNIZATION: ICD-10-CM

## 2022-09-13 PROCEDURE — 90471 IMMUNIZATION ADMIN: CPT

## 2022-09-13 PROCEDURE — 0502F SUBSEQUENT PRENATAL CARE: CPT

## 2022-09-13 PROCEDURE — 90715 TDAP VACCINE 7 YRS/> IM: CPT

## 2022-09-30 ENCOUNTER — NON-APPOINTMENT (OUTPATIENT)
Age: 37
End: 2022-09-30

## 2022-09-30 ENCOUNTER — APPOINTMENT (OUTPATIENT)
Dept: OBGYN | Facility: CLINIC | Age: 37
End: 2022-09-30

## 2022-09-30 ENCOUNTER — ASOB RESULT (OUTPATIENT)
Age: 37
End: 2022-09-30

## 2022-09-30 PROCEDURE — 76816 OB US FOLLOW-UP PER FETUS: CPT | Mod: 59

## 2022-09-30 PROCEDURE — 76818 FETAL BIOPHYS PROFILE W/NST: CPT

## 2022-09-30 PROCEDURE — 0502F SUBSEQUENT PRENATAL CARE: CPT

## 2022-10-10 ENCOUNTER — NON-APPOINTMENT (OUTPATIENT)
Age: 37
End: 2022-10-10

## 2022-10-10 ENCOUNTER — APPOINTMENT (OUTPATIENT)
Dept: OBGYN | Facility: CLINIC | Age: 37
End: 2022-10-10

## 2022-10-10 VITALS
HEIGHT: 63 IN | BODY MASS INDEX: 29.06 KG/M2 | DIASTOLIC BLOOD PRESSURE: 75 MMHG | WEIGHT: 164 LBS | SYSTOLIC BLOOD PRESSURE: 114 MMHG

## 2022-10-10 PROCEDURE — 90471 IMMUNIZATION ADMIN: CPT

## 2022-10-10 PROCEDURE — 90686 IIV4 VACC NO PRSV 0.5 ML IM: CPT

## 2022-10-10 PROCEDURE — 0502F SUBSEQUENT PRENATAL CARE: CPT

## 2022-10-21 ENCOUNTER — APPOINTMENT (OUTPATIENT)
Dept: OBGYN | Facility: CLINIC | Age: 37
End: 2022-10-21

## 2022-10-21 ENCOUNTER — ASOB RESULT (OUTPATIENT)
Age: 37
End: 2022-10-21

## 2022-10-21 PROCEDURE — 76818 FETAL BIOPHYS PROFILE W/NST: CPT

## 2022-10-21 PROCEDURE — 76816 OB US FOLLOW-UP PER FETUS: CPT | Mod: 59

## 2022-10-21 PROCEDURE — 76820 UMBILICAL ARTERY ECHO: CPT | Mod: 59

## 2022-10-21 PROCEDURE — 0502F SUBSEQUENT PRENATAL CARE: CPT

## 2022-10-24 ENCOUNTER — NON-APPOINTMENT (OUTPATIENT)
Age: 37
End: 2022-10-24

## 2022-10-24 LAB — B-HEM STREP SPEC QL CULT: ABNORMAL

## 2022-10-28 ENCOUNTER — APPOINTMENT (OUTPATIENT)
Dept: OBGYN | Facility: CLINIC | Age: 37
End: 2022-10-28

## 2022-10-28 ENCOUNTER — ASOB RESULT (OUTPATIENT)
Age: 37
End: 2022-10-28

## 2022-10-28 PROCEDURE — 76820 UMBILICAL ARTERY ECHO: CPT | Mod: 59

## 2022-10-28 PROCEDURE — 76818 FETAL BIOPHYS PROFILE W/NST: CPT

## 2022-10-28 PROCEDURE — 0502F SUBSEQUENT PRENATAL CARE: CPT

## 2022-11-04 ENCOUNTER — APPOINTMENT (OUTPATIENT)
Dept: OBGYN | Facility: CLINIC | Age: 37
End: 2022-11-04

## 2022-11-04 ENCOUNTER — ASOB RESULT (OUTPATIENT)
Age: 37
End: 2022-11-04

## 2022-11-04 PROCEDURE — 0502F SUBSEQUENT PRENATAL CARE: CPT

## 2022-11-04 PROCEDURE — 76820 UMBILICAL ARTERY ECHO: CPT | Mod: 59

## 2022-11-04 PROCEDURE — 76818 FETAL BIOPHYS PROFILE W/NST: CPT

## 2022-11-10 ENCOUNTER — NON-APPOINTMENT (OUTPATIENT)
Age: 37
End: 2022-11-10

## 2022-11-11 ENCOUNTER — ASOB RESULT (OUTPATIENT)
Age: 37
End: 2022-11-11

## 2022-11-11 ENCOUNTER — TRANSCRIPTION ENCOUNTER (OUTPATIENT)
Age: 37
End: 2022-11-11

## 2022-11-11 ENCOUNTER — INPATIENT (INPATIENT)
Facility: HOSPITAL | Age: 37
LOS: 3 days | Discharge: HOME | End: 2022-11-15
Attending: OBSTETRICS & GYNECOLOGY | Admitting: OBSTETRICS & GYNECOLOGY
Payer: COMMERCIAL

## 2022-11-11 ENCOUNTER — APPOINTMENT (OUTPATIENT)
Dept: OBGYN | Facility: CLINIC | Age: 37
End: 2022-11-11

## 2022-11-11 VITALS — OXYGEN SATURATION: 99 %

## 2022-11-11 DIAGNOSIS — Z3A.00 WEEKS OF GESTATION OF PREGNANCY NOT SPECIFIED: ICD-10-CM

## 2022-11-11 DIAGNOSIS — O26.899 OTHER SPECIFIED PREGNANCY RELATED CONDITIONS, UNSPECIFIED TRIMESTER: ICD-10-CM

## 2022-11-11 DIAGNOSIS — Z34.80 ENCOUNTER FOR SUPERVISION OF OTHER NORMAL PREGNANCY, UNSPECIFIED TRIMESTER: ICD-10-CM

## 2022-11-11 PROCEDURE — 76820 UMBILICAL ARTERY ECHO: CPT | Mod: 59

## 2022-11-11 PROCEDURE — 0502F SUBSEQUENT PRENATAL CARE: CPT

## 2022-11-11 PROCEDURE — 76818 FETAL BIOPHYS PROFILE W/NST: CPT

## 2022-11-11 PROCEDURE — 76816 OB US FOLLOW-UP PER FETUS: CPT | Mod: 59

## 2022-11-11 RX ORDER — CITRIC ACID/SODIUM CITRATE 300-500 MG
15 SOLUTION, ORAL ORAL EVERY 6 HOURS
Refills: 0 | Status: DISCONTINUED | OUTPATIENT
Start: 2022-11-11 | End: 2022-11-13

## 2022-11-11 RX ORDER — CHLORHEXIDINE GLUCONATE 213 G/1000ML
1 SOLUTION TOPICAL ONCE
Refills: 0 | Status: DISCONTINUED | OUTPATIENT
Start: 2022-11-11 | End: 2022-11-13

## 2022-11-11 RX ORDER — SODIUM CHLORIDE 9 MG/ML
1000 INJECTION, SOLUTION INTRAVENOUS
Refills: 0 | Status: DISCONTINUED | OUTPATIENT
Start: 2022-11-11 | End: 2022-11-13

## 2022-11-11 RX ORDER — OXYTOCIN 10 UNIT/ML
333.33 VIAL (ML) INJECTION
Qty: 20 | Refills: 0 | Status: DISCONTINUED | OUTPATIENT
Start: 2022-11-11 | End: 2022-11-15

## 2022-11-11 NOTE — OB RN PATIENT PROFILE - BREAST MILK IS MORE DIGESTIBLE, MAKING VOMITING, DIARRHEA, GAS AND CONSTIPATION LESS COMMON
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 patient to call our office with questions or concerns. Patient would like to have a work excuse faxed to her job at [phone].         ----- Message from Mirtha Stewart sent at 9/7/2017  8:19 AM EDT -----  Contact: pt  PT HAD A STENT PLACED YESTERDAY AND IS WANTING TO KNOW HOW LONG SHE IS TO BE OFF WORK AND CONFIRM ANY OTHER RESTRICTIONS SHE MAY HAVE AND FOR HOW LONG. PLEASE CALL PT [phone]    
Statement Selected

## 2022-11-11 NOTE — OB RN PATIENT PROFILE - NSTRANFUSIONOBJECTION_GEN_ALL_CORE_SIUH
Intubation  Performed by: Samara Sainz CRNA  Authorized by: Samara Sainz CRNA     Intubation:     Induction:  Intravenous    Intubated:  Postinduction    Mask Ventilation:  Easy with oral airway    Attempts:  1    Attempted By:  CRNA    Method of Intubation:  Direct    Blade:  Moe 2    Laryngeal View Grade: Grade I - full view of chords      Difficult Airway Encountered?: No      Complications:  None    Airway Device:  Oral endotracheal tube    Airway Device Size:  7.0    Style/Cuff Inflation:  Cuffed    Inflation Amount (mL):  4    Tube secured:  21    Secured at:  The lips    Placement Verified By:  Capnometry    Complicating Factors:  Obesity         Patient has no objection to blood transfusions.

## 2022-11-12 LAB
BASOPHILS # BLD AUTO: 0.05 K/UL — SIGNIFICANT CHANGE UP (ref 0–0.2)
BASOPHILS NFR BLD AUTO: 0.6 % — SIGNIFICANT CHANGE UP (ref 0–2)
BLD GP AB SCN SERPL QL: NEGATIVE — SIGNIFICANT CHANGE UP
COVID-19 SPIKE DOMAIN AB INTERP: POSITIVE
COVID-19 SPIKE DOMAIN ANTIBODY RESULT: >250 U/ML — HIGH
EOSINOPHIL # BLD AUTO: 0.03 K/UL — SIGNIFICANT CHANGE UP (ref 0–0.5)
EOSINOPHIL NFR BLD AUTO: 0.3 % — SIGNIFICANT CHANGE UP (ref 0–6)
HCT VFR BLD CALC: 37.8 % — SIGNIFICANT CHANGE UP (ref 34.5–45)
HGB BLD-MCNC: 12 G/DL — SIGNIFICANT CHANGE UP (ref 11.5–15.5)
IMM GRANULOCYTES NFR BLD AUTO: 0.7 % — SIGNIFICANT CHANGE UP (ref 0–0.9)
LYMPHOCYTES # BLD AUTO: 2.07 K/UL — SIGNIFICANT CHANGE UP (ref 1–3.3)
LYMPHOCYTES # BLD AUTO: 22.8 % — SIGNIFICANT CHANGE UP (ref 13–44)
MCHC RBC-ENTMCNC: 25.9 PG — LOW (ref 27–34)
MCHC RBC-ENTMCNC: 31.7 GM/DL — LOW (ref 32–36)
MCV RBC AUTO: 81.6 FL — SIGNIFICANT CHANGE UP (ref 80–100)
MONOCYTES # BLD AUTO: 0.62 K/UL — SIGNIFICANT CHANGE UP (ref 0–0.9)
MONOCYTES NFR BLD AUTO: 6.8 % — SIGNIFICANT CHANGE UP (ref 2–14)
NEUTROPHILS # BLD AUTO: 6.24 K/UL — SIGNIFICANT CHANGE UP (ref 1.8–7.4)
NEUTROPHILS NFR BLD AUTO: 68.8 % — SIGNIFICANT CHANGE UP (ref 43–77)
NRBC # BLD: 0 /100 WBCS — SIGNIFICANT CHANGE UP (ref 0–0)
PLATELET # BLD AUTO: 216 K/UL — SIGNIFICANT CHANGE UP (ref 150–400)
RBC # BLD: 4.63 M/UL — SIGNIFICANT CHANGE UP (ref 3.8–5.2)
RBC # FLD: 16.1 % — HIGH (ref 10.3–14.5)
RH IG SCN BLD-IMP: POSITIVE — SIGNIFICANT CHANGE UP
SARS-COV-2 IGG+IGM SERPL QL IA: >250 U/ML — HIGH
SARS-COV-2 IGG+IGM SERPL QL IA: POSITIVE
SARS-COV-2 RNA SPEC QL NAA+PROBE: SIGNIFICANT CHANGE UP
T PALLIDUM AB TITR SER: NEGATIVE — SIGNIFICANT CHANGE UP
WBC # BLD: 9.07 K/UL — SIGNIFICANT CHANGE UP (ref 3.8–10.5)
WBC # FLD AUTO: 9.07 K/UL — SIGNIFICANT CHANGE UP (ref 3.8–10.5)

## 2022-11-12 PROCEDURE — 59510 CESAREAN DELIVERY: CPT

## 2022-11-12 DEVICE — SURGICEL 2 X 14": Type: IMPLANTABLE DEVICE | Status: FUNCTIONAL

## 2022-11-12 RX ORDER — LEVOTHYROXINE SODIUM 125 MCG
100 TABLET ORAL
Refills: 0 | Status: DISCONTINUED | OUTPATIENT
Start: 2022-11-12 | End: 2022-11-15

## 2022-11-12 RX ORDER — NALOXONE HYDROCHLORIDE 4 MG/.1ML
0.1 SPRAY NASAL
Refills: 0 | Status: DISCONTINUED | OUTPATIENT
Start: 2022-11-13 | End: 2022-11-15

## 2022-11-12 RX ORDER — ONDANSETRON 8 MG/1
4 TABLET, FILM COATED ORAL EVERY 6 HOURS
Refills: 0 | Status: DISCONTINUED | OUTPATIENT
Start: 2022-11-13 | End: 2022-11-15

## 2022-11-12 RX ORDER — NALBUPHINE HYDROCHLORIDE 10 MG/ML
2.5 INJECTION, SOLUTION INTRAMUSCULAR; INTRAVENOUS; SUBCUTANEOUS EVERY 6 HOURS
Refills: 0 | Status: DISCONTINUED | OUTPATIENT
Start: 2022-11-13 | End: 2022-11-15

## 2022-11-12 RX ORDER — AMPICILLIN TRIHYDRATE 250 MG
1 CAPSULE ORAL EVERY 4 HOURS
Refills: 0 | Status: DISCONTINUED | OUTPATIENT
Start: 2022-11-12 | End: 2022-11-13

## 2022-11-12 RX ORDER — OXYCODONE HYDROCHLORIDE 5 MG/1
10 TABLET ORAL
Refills: 0 | Status: DISCONTINUED | OUTPATIENT
Start: 2022-11-13 | End: 2022-11-13

## 2022-11-12 RX ORDER — OXYCODONE HYDROCHLORIDE 5 MG/1
5 TABLET ORAL
Refills: 0 | Status: DISCONTINUED | OUTPATIENT
Start: 2022-11-13 | End: 2022-11-13

## 2022-11-12 RX ORDER — SODIUM CHLORIDE 9 MG/ML
1000 INJECTION INTRAMUSCULAR; INTRAVENOUS; SUBCUTANEOUS
Refills: 0 | Status: DISCONTINUED | OUTPATIENT
Start: 2022-11-12 | End: 2022-11-15

## 2022-11-12 RX ORDER — LEVOTHYROXINE SODIUM 125 MCG
150 TABLET ORAL
Refills: 0 | Status: DISCONTINUED | OUTPATIENT
Start: 2022-11-12 | End: 2022-11-15

## 2022-11-12 RX ORDER — SODIUM CHLORIDE 9 MG/ML
300 INJECTION INTRAMUSCULAR; INTRAVENOUS; SUBCUTANEOUS ONCE
Refills: 0 | Status: COMPLETED | OUTPATIENT
Start: 2022-11-12 | End: 2022-11-12

## 2022-11-12 RX ORDER — OXYTOCIN 10 UNIT/ML
2 VIAL (ML) INJECTION
Qty: 30 | Refills: 0 | Status: DISCONTINUED | OUTPATIENT
Start: 2022-11-12 | End: 2022-11-15

## 2022-11-12 RX ORDER — AMPICILLIN TRIHYDRATE 250 MG
2 CAPSULE ORAL ONCE
Refills: 0 | Status: COMPLETED | OUTPATIENT
Start: 2022-11-12 | End: 2022-11-12

## 2022-11-12 RX ORDER — MORPHINE SULFATE 50 MG/1
2 CAPSULE, EXTENDED RELEASE ORAL ONCE
Refills: 0 | Status: DISCONTINUED | OUTPATIENT
Start: 2022-11-13 | End: 2022-11-13

## 2022-11-12 RX ORDER — DEXAMETHASONE 0.5 MG/5ML
4 ELIXIR ORAL EVERY 6 HOURS
Refills: 0 | Status: DISCONTINUED | OUTPATIENT
Start: 2022-11-13 | End: 2022-11-15

## 2022-11-12 RX ADMIN — Medication 108 GRAM(S): at 17:30

## 2022-11-12 RX ADMIN — Medication 150 MICROGRAM(S): at 06:56

## 2022-11-12 RX ADMIN — Medication 2 MILLIUNIT(S)/MIN: at 10:03

## 2022-11-12 RX ADMIN — Medication 200 GRAM(S): at 01:32

## 2022-11-12 RX ADMIN — Medication 108 GRAM(S): at 05:42

## 2022-11-12 RX ADMIN — SODIUM CHLORIDE 125 MILLILITER(S): 9 INJECTION INTRAMUSCULAR; INTRAVENOUS; SUBCUTANEOUS at 16:54

## 2022-11-12 RX ADMIN — Medication 108 GRAM(S): at 13:24

## 2022-11-12 RX ADMIN — Medication 0.25 MILLIGRAM(S): at 02:16

## 2022-11-12 RX ADMIN — Medication 108 GRAM(S): at 21:51

## 2022-11-12 RX ADMIN — Medication 0.25 MILLIGRAM(S): at 22:38

## 2022-11-12 RX ADMIN — SODIUM CHLORIDE 600 MILLILITER(S): 9 INJECTION INTRAMUSCULAR; INTRAVENOUS; SUBCUTANEOUS at 16:06

## 2022-11-12 RX ADMIN — Medication 108 GRAM(S): at 09:18

## 2022-11-12 NOTE — OB PROVIDER H&P - HISTORY OF PRESENT ILLNESS
Subjective  HPI: 37yoF  at 39w1d who presents for scheduled IOL for IUGR with AC <7%.  +FM. -LOF. -CTXs. -VB. Pt denies any other concerns.    – PNC: Followed for serial growth in the 3rd trimester. Last growth on 10/2 with an of 2483g. AC <7%. Normal dopplers. Roughly extrapolated to 3000g  GBS postive.   – OBHx:   #1 - SAB in   – GynHx: Polypectomy ()   – PMH: Hypothyroidism.   – PSH: Polpectomy as above.   – Psych: denies   – Social: denies   – Meds: PNV, Synthroid 100mcg, 150mcg on Wednesday and Saturday.   – Allergies: NKDA  – Will accept blood transfusions? Yes    Objective  Vital Signs Last 24 Hrs  T(C): --  T(F): --  HR: 79 (2022 00:10) (74 - 93)  BP: 136/80 (2022 23:31) (136/80 - 136/80)  BP(mean): --  RR: --  SpO2: 96% (2022 00:10) (96% - 99%)      – PE:   CV: RRR  Pulm: breathing comfortably on RA  Abd: gravid, nontender  Extr: moving all extremities with ease  – VE: 1/50/-3  – FHT: baseline 120, mod variability, +accels, -decels  – [place]: None.   – EFW: 2483g by last sono. Approx 3000g today.   – Sono: vertex

## 2022-11-12 NOTE — OB PROVIDER LABOR PROGRESS NOTE - NS_SUBJECTIVE/OBJECTIVE_OBGYN_ALL_OB_FT
Pt seen after 6 min deceleration. Patient had just had vaginal cytotec placed 10 min ago.   Pt with a palpable 3 min contraction noted.   Vaginal cytotec was removed and the vaginal vault was flushed with normal saline.   Terb x1 was administered.   With quick return to baseline after terb was administered.

## 2022-11-12 NOTE — OB PROVIDER LABOR PROGRESS NOTE - NS_SUBJECTIVE/OBJECTIVE_OBGYN_ALL_OB_FT
NP Chart Note:    The patient was examined for  further labor management. The cervical balloon was removed and examined, found to be 4/80/-3 posterior cervix. The patient is resting comfortably with an epidural in place

## 2022-11-12 NOTE — CHART NOTE - NSCHARTNOTEFT_GEN_A_CORE
R4 OB Tracing Note    T(C): 36.7 (11-12-22 @ 05:43), Max: 37.6 (11-11-22 @ 23:31)  HR: 62 (11-12-22 @ 06:50) (61 - 96)  BP: 114/64 (11-12-22 @ 05:43) (105/58 - 136/80)  RR: 18 (11-12-22 @ 05:43) (18 - 18)  SpO2: 98% (11-12-22 @ 06:50) (94% - 100%)    EFM:  120 bpm, mod gertrudis, +accels, -decels  [place]: cx irregular    A/P 37y P0 admitted for IOL for IUGR (AC 7%), with intermittent cat 2 tracing, s/p terbx1 overnight, now with CB in place. FHR overall reassuring at this time.  -Will resume IOL with ripening vs pitocin, to be dw Dr. Israel.     Cody PGY4

## 2022-11-12 NOTE — OB PROVIDER LABOR PROGRESS NOTE - NS_SUBJECTIVE/OBJECTIVE_OBGYN_ALL_OB_FT
Patient reports increased rectal pressure. Pit@6u Patient reports increased rectal pressure. Pit@6u    T(C): 36.5 (11-12-22 @ 18:34), Max: 37.6 (11-11-22 @ 23:31)  HR: 80 (11-12-22 @ 19:15) (58 - 96)  BP: 104/59 (11-12-22 @ 19:05) (91/55 - 136/80)  RR: 18 (11-12-22 @ 18:34) (18 - 18)  SpO2: 100% (11-12-22 @ 19:15) (84% - 100%)

## 2022-11-12 NOTE — OB PROVIDER LABOR PROGRESS NOTE - NS_OBIHIFHRDETAILS_OBGYN_ALL_OB_FT
Baseline 120 Moderate variability. +Accels - Decels
Resuscitated tracing: Baseline 150 Moderate variability. +Accels - Decels
Cat 1: 130/mod variability/ - accels/ 3 min decel
130/mod gertrudis/+accels/intermittent variable decels
125 moderate variability, + acels, -decels, category 1 tracing
Cat 1: 130/mod variability/ + accels/ - decels

## 2022-11-12 NOTE — PRE-ANESTHESIA EVALUATION ADULT - NSANTHOSAYNRD_GEN_A_CORE
No. CAITLYN screening performed.  STOP BANG Legend: 0-2 = LOW Risk; 3-4 = INTERMEDIATE Risk; 5-8 = HIGH Risk
No. CAITLYN screening performed.  STOP BANG Legend: 0-2 = LOW Risk; 3-4 = INTERMEDIATE Risk; 5-8 = HIGH Risk

## 2022-11-12 NOTE — OB PROVIDER H&P - ASSESSMENT
Assessment  – IUGR w/ AC <7%. Normal dopplers. GBS positive.    Plan  Admit to LND. Routine Labs. IVF.  IOL w/ buccal cytotec  Fetus: cat 1 tracing. Continuous EFM.   Prenatal issues: none  GBS positive Will receive ampicillin   COVID pending     Patient discussed with attending physician, Dr. Tutu Heller, PGY-2 Assessment  – IUGR w/ AC <7%. Normal dopplers. GBS positive.    Plan  Admit to LND. Routine Labs. IVF.  IOL w/ vaginal cytotec  Fetus: cat 1 tracing. Continuous EFM.   Prenatal issues: none  GBS positive Will receive ampicillin   COVID pending     Patient discussed with attending physician, Dr. Tutu Heller, PGY-2

## 2022-11-12 NOTE — OB PROVIDER LABOR PROGRESS NOTE - NS_SUBJECTIVE/OBJECTIVE_OBGYN_ALL_OB_FT
OB PA Progress Note (late entry secondary to clinical responsibility)    Pt seen and evaluated for placement of vaginal cytotec dose # 1. VC placed without incidence.    Exam  VSS  SVE: 1/50/-3   EFM: Cat I  [place]: irregular

## 2022-11-12 NOTE — OB PROVIDER LABOR PROGRESS NOTE - NS_SUBJECTIVE/OBJECTIVE_OBGYN_ALL_OB_FT
Called by RN for 3 min decel which recovered after turning off pitocin.    T(C): 36.5 (11-12-22 @ 18:34), Max: 37.6 (11-11-22 @ 23:31)  HR: 88 (11-12-22 @ 19:30) (58 - 96)  BP: 110/53 (11-12-22 @ 19:21) (91/55 - 136/80)  RR: 18 (11-12-22 @ 18:34) (18 - 18)  SpO2: 95% (11-12-22 @ 19:30) (84% - 100%)

## 2022-11-12 NOTE — OB PROVIDER LABOR PROGRESS NOTE - ASSESSMENT
38yo  @39+1 IOL for IUGR (AC 7%)    - SVE: /-2  - FHT Cat 1, reassuring, ctm  - ISE/IUPC/AI  - c/w pitocin    d/w Dr. Jhonatan Oscar, PGY2
Cervical balloon instilled with 60/60 cc of normal saline. Placed without complication. Pt tolerated the procedure well.     Plan per Dr. Jhonatan Heller, PGY-2 
Will proceed with CB for induction at this time.     Pamela Ellis, and Aileen present in the room   DW: Dr. Jhonatan Heller, PGY-2 
38 y/o  @ 39.2 weeks admitted IUGR ( AC 7%)  -cervical balloon was removed  -will continue with pitocin     d/w Dr. Jhonatan Cross NP
38yo  @39+1 IOL for IUGR    - SVE: /-2  - pitocin turned off  - FHT Cat 2, now Cat 1  - c/w maternal rescucitation    d/w Dr. Israel who is en route  DIEUDONNE Oscar, PGY2
A/P:  - VC in place  - EF Cat I    Linda Rodriguez PA-C
- SROM on exam, clear fluid  - ISE and IUPC placed  - start amnioinfusion for variables  - pitocin paused  - maternal repositioning  - EFM, [place]  - epidural in place    D/W Dr. Jhonatan Bates pgy3

## 2022-11-13 LAB
BASOPHILS # BLD AUTO: 0.03 K/UL — SIGNIFICANT CHANGE UP (ref 0–0.2)
BASOPHILS NFR BLD AUTO: 0.2 % — SIGNIFICANT CHANGE UP (ref 0–2)
EOSINOPHIL # BLD AUTO: 0 K/UL — SIGNIFICANT CHANGE UP (ref 0–0.5)
EOSINOPHIL NFR BLD AUTO: 0 % — SIGNIFICANT CHANGE UP (ref 0–6)
HCT VFR BLD CALC: 32.1 % — LOW (ref 34.5–45)
HGB BLD-MCNC: 10.3 G/DL — LOW (ref 11.5–15.5)
IMM GRANULOCYTES NFR BLD AUTO: 0.6 % — SIGNIFICANT CHANGE UP (ref 0–0.9)
LYMPHOCYTES # BLD AUTO: 0.82 K/UL — LOW (ref 1–3.3)
LYMPHOCYTES # BLD AUTO: 5.2 % — LOW (ref 13–44)
MCHC RBC-ENTMCNC: 26.5 PG — LOW (ref 27–34)
MCHC RBC-ENTMCNC: 32.1 GM/DL — SIGNIFICANT CHANGE UP (ref 32–36)
MCV RBC AUTO: 82.5 FL — SIGNIFICANT CHANGE UP (ref 80–100)
MONOCYTES # BLD AUTO: 0.64 K/UL — SIGNIFICANT CHANGE UP (ref 0–0.9)
MONOCYTES NFR BLD AUTO: 4.1 % — SIGNIFICANT CHANGE UP (ref 2–14)
NEUTROPHILS # BLD AUTO: 14.05 K/UL — HIGH (ref 1.8–7.4)
NEUTROPHILS NFR BLD AUTO: 89.9 % — HIGH (ref 43–77)
NRBC # BLD: 0 /100 WBCS — SIGNIFICANT CHANGE UP (ref 0–0)
PLATELET # BLD AUTO: 181 K/UL — SIGNIFICANT CHANGE UP (ref 150–400)
RBC # BLD: 3.89 M/UL — SIGNIFICANT CHANGE UP (ref 3.8–5.2)
RBC # FLD: 16.1 % — HIGH (ref 10.3–14.5)
WBC # BLD: 15.64 K/UL — HIGH (ref 3.8–10.5)
WBC # FLD AUTO: 15.64 K/UL — HIGH (ref 3.8–10.5)

## 2022-11-13 RX ORDER — TETANUS TOXOID, REDUCED DIPHTHERIA TOXOID AND ACELLULAR PERTUSSIS VACCINE, ADSORBED 5; 2.5; 8; 8; 2.5 [IU]/.5ML; [IU]/.5ML; UG/.5ML; UG/.5ML; UG/.5ML
0.5 SUSPENSION INTRAMUSCULAR ONCE
Refills: 0 | Status: DISCONTINUED | OUTPATIENT
Start: 2022-11-13 | End: 2022-11-15

## 2022-11-13 RX ORDER — IBUPROFEN 200 MG
600 TABLET ORAL EVERY 6 HOURS
Refills: 0 | Status: COMPLETED | OUTPATIENT
Start: 2022-11-13 | End: 2023-10-12

## 2022-11-13 RX ORDER — OXYCODONE HYDROCHLORIDE 5 MG/1
5 TABLET ORAL
Refills: 0 | Status: COMPLETED | OUTPATIENT
Start: 2022-11-13 | End: 2022-11-20

## 2022-11-13 RX ORDER — MAGNESIUM HYDROXIDE 400 MG/1
30 TABLET, CHEWABLE ORAL
Refills: 0 | Status: DISCONTINUED | OUTPATIENT
Start: 2022-11-13 | End: 2022-11-15

## 2022-11-13 RX ORDER — OXYCODONE HYDROCHLORIDE 5 MG/1
5 TABLET ORAL ONCE
Refills: 0 | Status: DISCONTINUED | OUTPATIENT
Start: 2022-11-13 | End: 2022-11-15

## 2022-11-13 RX ORDER — ACETAMINOPHEN 500 MG
975 TABLET ORAL
Refills: 0 | Status: DISCONTINUED | OUTPATIENT
Start: 2022-11-13 | End: 2022-11-15

## 2022-11-13 RX ORDER — LANOLIN
1 OINTMENT (GRAM) TOPICAL EVERY 6 HOURS
Refills: 0 | Status: DISCONTINUED | OUTPATIENT
Start: 2022-11-13 | End: 2022-11-15

## 2022-11-13 RX ORDER — OXYTOCIN 10 UNIT/ML
333.33 VIAL (ML) INJECTION
Qty: 20 | Refills: 0 | Status: DISCONTINUED | OUTPATIENT
Start: 2022-11-13 | End: 2022-11-15

## 2022-11-13 RX ORDER — KETOROLAC TROMETHAMINE 30 MG/ML
30 SYRINGE (ML) INJECTION EVERY 6 HOURS
Refills: 0 | Status: DISCONTINUED | OUTPATIENT
Start: 2022-11-13 | End: 2022-11-14

## 2022-11-13 RX ORDER — SIMETHICONE 80 MG/1
80 TABLET, CHEWABLE ORAL EVERY 4 HOURS
Refills: 0 | Status: DISCONTINUED | OUTPATIENT
Start: 2022-11-13 | End: 2022-11-15

## 2022-11-13 RX ORDER — DIPHENHYDRAMINE HCL 50 MG
25 CAPSULE ORAL EVERY 6 HOURS
Refills: 0 | Status: DISCONTINUED | OUTPATIENT
Start: 2022-11-13 | End: 2022-11-15

## 2022-11-13 RX ORDER — IBUPROFEN 200 MG
600 TABLET ORAL EVERY 6 HOURS
Refills: 0 | Status: DISCONTINUED | OUTPATIENT
Start: 2022-11-13 | End: 2022-11-15

## 2022-11-13 RX ORDER — HEPARIN SODIUM 5000 [USP'U]/ML
5000 INJECTION INTRAVENOUS; SUBCUTANEOUS EVERY 12 HOURS
Refills: 0 | Status: DISCONTINUED | OUTPATIENT
Start: 2022-11-13 | End: 2022-11-15

## 2022-11-13 RX ORDER — SODIUM CHLORIDE 9 MG/ML
1000 INJECTION, SOLUTION INTRAVENOUS
Refills: 0 | Status: DISCONTINUED | OUTPATIENT
Start: 2022-11-13 | End: 2022-11-15

## 2022-11-13 RX ADMIN — Medication 975 MILLIGRAM(S): at 09:30

## 2022-11-13 RX ADMIN — Medication 30 MILLIGRAM(S): at 13:00

## 2022-11-13 RX ADMIN — Medication 975 MILLIGRAM(S): at 08:20

## 2022-11-13 RX ADMIN — Medication 30 MILLIGRAM(S): at 06:38

## 2022-11-13 RX ADMIN — HEPARIN SODIUM 5000 UNIT(S): 5000 INJECTION INTRAVENOUS; SUBCUTANEOUS at 08:20

## 2022-11-13 RX ADMIN — Medication 975 MILLIGRAM(S): at 16:12

## 2022-11-13 RX ADMIN — ONDANSETRON 4 MILLIGRAM(S): 8 TABLET, FILM COATED ORAL at 05:16

## 2022-11-13 RX ADMIN — SODIUM CHLORIDE 125 MILLILITER(S): 9 INJECTION, SOLUTION INTRAVENOUS at 12:05

## 2022-11-13 RX ADMIN — Medication 975 MILLIGRAM(S): at 17:00

## 2022-11-13 RX ADMIN — HEPARIN SODIUM 5000 UNIT(S): 5000 INJECTION INTRAVENOUS; SUBCUTANEOUS at 21:12

## 2022-11-13 RX ADMIN — Medication 975 MILLIGRAM(S): at 22:15

## 2022-11-13 RX ADMIN — Medication 30 MILLIGRAM(S): at 18:45

## 2022-11-13 RX ADMIN — Medication 30 MILLIGRAM(S): at 12:04

## 2022-11-13 RX ADMIN — Medication 30 MILLIGRAM(S): at 00:22

## 2022-11-13 RX ADMIN — Medication 30 MILLIGRAM(S): at 18:13

## 2022-11-13 RX ADMIN — Medication 975 MILLIGRAM(S): at 21:15

## 2022-11-13 RX ADMIN — Medication 30 MILLIGRAM(S): at 06:05

## 2022-11-13 RX ADMIN — Medication 100 MICROGRAM(S): at 06:05

## 2022-11-13 NOTE — OB PROVIDER DELIVERY SUMMARY - NSPROVIDERDELIVERYNOTE_OBGYN_ALL_OB_FT
Primary LTCS for Cat 2 tracing, uncomplicated. Body nuchal cord x2  Viable female infant, vertex presentation, Apgars 8/9, cord gasses sent  Grossly normal fallopian tubes, uterus, and ovaries  Uterus closed in 1 locked running layer with PDS. Surgicel original placed over hysterotomy.  Muscle reapproximated with 2-0 chromic  Fascia closed with 0 vicryl  Subcutaneous closed with 2-0 plain  Skin closed with 3-0 vicryl    EBL: 800  IVF: 1000  UOP: 200    DIEUDONNE Oscar PGY2  w/ Dr. Israel    Dictation# [id number]

## 2022-11-13 NOTE — OB NEONATOLOGY/PEDIATRICIAN DELIVERY SUMMARY - NSPEDSNEONOTESA_OBGYN_ALL_OB_FT
Peds called to OR for Cat 2 tracing. 39.1wk SGA female born via CS on  at 2257 to a  38 y/o  mother.  Maternal history of hypothyroid. Maternal labs include Blood Type B+, HIV - , RPR NR , Rubella I , Hep B - , GBS + treated with amp x6, COVID -. SROM at 1547 with clear fluids (ROM hours: 7 HRS 10MIN). Baby emerged vigorous, crying, was warmed, dried suctioned and stimulated with APGARS of 8/9 . Resuscitation included: bulb syringe. Mom plans to initiate breastfeeding, consents Hep B vaccine. Highest maternal temp: 37.6. EOS .20    Physical Exam:  Gen: awake and active  HEENT: anterior fontanel open soft and flat, no cleft lip/palate, ears normal set, no ear pits or tags, nares clinically patent  Resp: no increased work of breathing, good air entry b/l, clear to auscultation bilaterally  Cardio: Normal S1/S2, regular rate and rhythm  Abd: soft, non tender, non distended, + bowel sounds  Neuro: +grasp/suck/kalee, normal tone  Extremities: negative wylie and ortolani, moving all extremities, full range of motion x 4, no crepitus  Skin: pink, warm, Sacral dimple with base  Genitals: Normal female anatomy- hymenal tag, Alex 1, anus appears patent

## 2022-11-13 NOTE — OB RN DELIVERY SUMMARY - NSDELAYEDCLAMPA_OBGYN_ALL_OB
"I called foster Mom, told her Jaleesa is not in today,   That the 3/27 appt with nephrology should be ok, and as for the fluoxetine and other med questions, it appears we don't fill those , they come from psychiatrist. \"Oh, ok, that makes sense, I will talk to the psychiatrist about those meds, she has an upcoming appt. \"    Advised I will route this to Jaleesa for her review ,and we will only call back if there are other recommendations.   Otherwise, MAGDALENA Lazcano, she will see nephrology on 3/27 and they will discuss her fluoxetine and other meds with psychiatrist when they see him,   Thanks,   Bonnie Hawthorne RNC    "
"Notified foster mom, Lorin, \"ok, thanks, she has another lab to do also. \"    Bonnie Hawthorne RNC      "
I am okay with nephrology appt on 3/27. Since we stopped her Metformin I recommend to recheck kidney function (Creatinine level) in 2 weeks, to make sure its improving, order in.     Jaleesa Velasquez, CNP   
Reason for Call:  Other call back    Detailed comments: Divina has a nephology appt on 3/27/2017.  Lorin is wondering if this is okay date for her to be seen?  Please advise.  Also she would like you to check her medications again. She is wondering if the Fluoxetine is DC.    Phone Number Patient can be reached at: Home number on file [phone] (home)    Best Time: any    Can we leave a detailed message on this number? YES    Call taken on 2/28/2017 at 11:35 AM by Jennifer Kaplan      
Yes

## 2022-11-13 NOTE — OB RN DELIVERY SUMMARY - NSSELHIDDEN_OBGYN_ALL_OB_FT
[NS_DeliveryAttending1_OBGYN_ALL_OB_FT:[id number]],[NS_DeliveryAssist1_OBGYN_ALL_OB_FT:[id number]],[NS_DeliveryRN_OBGYN_ALL_OB_FT:[id number]]

## 2022-11-13 NOTE — OB PROVIDER DELIVERY SUMMARY - NSSELHIDDEN_OBGYN_ALL_OB_FT
[NS_DeliveryAttending1_OBGYN_ALL_OB_FT:[id number]],[NS_DeliveryAssist1_OBGYN_ALL_OB_FT:[id number]],[NS_DeliveryRN_OBGYN_ALL_OB_FT:[id number]]

## 2022-11-13 NOTE — OB PROVIDER DELIVERY SUMMARY - NSCSDELIVATYPE_OBGYN_ALL_OB
Use the incentive spirometer every hour while awake for 10-15 breaths at a time.  Take the pain medication as needed.  Do not drive while taking the pain medicine.  Take Tylenol as needed for breakthrough pain.  Do not exceed 3000 mg of acetaminophen in 24 hours.  Use ice to the affected area.  Follow-up with your primary care doctor for re-evaluation.  
Primary

## 2022-11-13 NOTE — OB RN DELIVERY SUMMARY - NS_SEPSISRSKCALC_OBGYN_ALL_OB_FT
EOS calculated successfully. EOS Risk Factor: 0.5/1000 live births ([facility] national incidence); GA=39w1d; Temp=99.7; ROM=7.167; GBS='Positive'; Antibiotics='GBS specific antibiotics > 2 hrs prior to birth'

## 2022-11-13 NOTE — OB PROVIDER DELIVERY SUMMARY - NSPRESENTATIONA_OBGYN_ALL_OB
If you are a smoker, it is important for your health to stop smoking. Please be aware that second hand smoke is also harmful. Cephalic

## 2022-11-13 NOTE — OB RN INTRAOPERATIVE NOTE - NSSELHIDDEN_OBGYN_ALL_OB_FT
[NS_DeliveryAttending1_OBGYN_ALL_OB_FT:[id number]],[NS_DeliveryAssist1_OBGYN_ALL_OB_FT:[id number]],[NS_DeliveryRN_OBGYN_ALL_OB_FT:[id number]]

## 2022-11-14 RX ORDER — OXYCODONE HYDROCHLORIDE 5 MG/1
5 TABLET ORAL
Refills: 0 | Status: DISCONTINUED | OUTPATIENT
Start: 2022-11-14 | End: 2022-11-15

## 2022-11-14 RX ADMIN — Medication 975 MILLIGRAM(S): at 21:30

## 2022-11-14 RX ADMIN — Medication 600 MILLIGRAM(S): at 11:29

## 2022-11-14 RX ADMIN — Medication 975 MILLIGRAM(S): at 03:40

## 2022-11-14 RX ADMIN — SIMETHICONE 80 MILLIGRAM(S): 80 TABLET, CHEWABLE ORAL at 15:17

## 2022-11-14 RX ADMIN — Medication 600 MILLIGRAM(S): at 06:23

## 2022-11-14 RX ADMIN — Medication 600 MILLIGRAM(S): at 12:20

## 2022-11-14 RX ADMIN — Medication 600 MILLIGRAM(S): at 00:13

## 2022-11-14 RX ADMIN — Medication 975 MILLIGRAM(S): at 04:40

## 2022-11-14 RX ADMIN — Medication 600 MILLIGRAM(S): at 18:05

## 2022-11-14 RX ADMIN — SIMETHICONE 80 MILLIGRAM(S): 80 TABLET, CHEWABLE ORAL at 20:53

## 2022-11-14 RX ADMIN — SIMETHICONE 80 MILLIGRAM(S): 80 TABLET, CHEWABLE ORAL at 03:41

## 2022-11-14 RX ADMIN — HEPARIN SODIUM 5000 UNIT(S): 5000 INJECTION INTRAVENOUS; SUBCUTANEOUS at 08:36

## 2022-11-14 RX ADMIN — Medication 975 MILLIGRAM(S): at 15:16

## 2022-11-14 RX ADMIN — Medication 600 MILLIGRAM(S): at 01:10

## 2022-11-14 RX ADMIN — HEPARIN SODIUM 5000 UNIT(S): 5000 INJECTION INTRAVENOUS; SUBCUTANEOUS at 20:50

## 2022-11-14 RX ADMIN — Medication 975 MILLIGRAM(S): at 20:49

## 2022-11-14 RX ADMIN — Medication 975 MILLIGRAM(S): at 16:10

## 2022-11-14 RX ADMIN — Medication 100 MICROGRAM(S): at 06:23

## 2022-11-14 RX ADMIN — Medication 975 MILLIGRAM(S): at 09:30

## 2022-11-14 RX ADMIN — Medication 975 MILLIGRAM(S): at 08:37

## 2022-11-14 RX ADMIN — SIMETHICONE 80 MILLIGRAM(S): 80 TABLET, CHEWABLE ORAL at 08:37

## 2022-11-14 NOTE — PROGRESS NOTE ADULT - ASSESSMENT
A/P:  Patient is a 37y  POD#1 s/p pLTCS for category II tracing.     PMHx: Hypothyroidism  Current Issues: None   A/P:  Patient is a 37y  POD#2 s/p pLTCS for category II tracing.     PMHx: Hypothyroidism  Current Issues: None

## 2022-11-14 NOTE — PROGRESS NOTE ADULT - PROBLEM SELECTOR PLAN 1
Increase OOB  pain protocol  DVT ppx  Regular diet  Continue routine post op care and pain protocol
Increase OOB  DVT ppx  Dressing removed  Due to void  Regular diet  AM CBC stable  Continue routine post op care and pain protocol

## 2022-11-15 ENCOUNTER — TRANSCRIPTION ENCOUNTER (OUTPATIENT)
Age: 37
End: 2022-11-15

## 2022-11-15 VITALS
DIASTOLIC BLOOD PRESSURE: 60 MMHG | OXYGEN SATURATION: 99 % | RESPIRATION RATE: 17 BRPM | HEART RATE: 82 BPM | TEMPERATURE: 99 F | SYSTOLIC BLOOD PRESSURE: 110 MMHG

## 2022-11-15 PROCEDURE — 86780 TREPONEMA PALLIDUM: CPT

## 2022-11-15 PROCEDURE — 36415 COLL VENOUS BLD VENIPUNCTURE: CPT

## 2022-11-15 PROCEDURE — 59025 FETAL NON-STRESS TEST: CPT

## 2022-11-15 PROCEDURE — C1889: CPT

## 2022-11-15 PROCEDURE — 86900 BLOOD TYPING SEROLOGIC ABO: CPT

## 2022-11-15 PROCEDURE — 86769 SARS-COV-2 COVID-19 ANTIBODY: CPT

## 2022-11-15 PROCEDURE — 86850 RBC ANTIBODY SCREEN: CPT

## 2022-11-15 PROCEDURE — 86901 BLOOD TYPING SEROLOGIC RH(D): CPT

## 2022-11-15 PROCEDURE — G0463: CPT

## 2022-11-15 PROCEDURE — 87635 SARS-COV-2 COVID-19 AMP PRB: CPT

## 2022-11-15 PROCEDURE — 85025 COMPLETE CBC W/AUTO DIFF WBC: CPT

## 2022-11-15 PROCEDURE — 59050 FETAL MONITOR W/REPORT: CPT

## 2022-11-15 RX ORDER — ACETAMINOPHEN 500 MG
3 TABLET ORAL
Qty: 0 | Refills: 0 | DISCHARGE
Start: 2022-11-15

## 2022-11-15 RX ORDER — IBUPROFEN 200 MG
1 TABLET ORAL
Qty: 0 | Refills: 0 | DISCHARGE
Start: 2022-11-15

## 2022-11-15 RX ADMIN — Medication 600 MILLIGRAM(S): at 06:48

## 2022-11-15 RX ADMIN — Medication 600 MILLIGRAM(S): at 00:21

## 2022-11-15 RX ADMIN — Medication 600 MILLIGRAM(S): at 05:45

## 2022-11-15 RX ADMIN — Medication 100 MICROGRAM(S): at 05:45

## 2022-11-15 RX ADMIN — HEPARIN SODIUM 5000 UNIT(S): 5000 INJECTION INTRAVENOUS; SUBCUTANEOUS at 08:40

## 2022-11-15 RX ADMIN — Medication 975 MILLIGRAM(S): at 03:39

## 2022-11-15 RX ADMIN — Medication 600 MILLIGRAM(S): at 01:00

## 2022-11-15 RX ADMIN — Medication 975 MILLIGRAM(S): at 04:22

## 2022-11-15 RX ADMIN — Medication 600 MILLIGRAM(S): at 12:15

## 2022-11-15 RX ADMIN — Medication 975 MILLIGRAM(S): at 08:41

## 2022-11-15 RX ADMIN — Medication 975 MILLIGRAM(S): at 09:30

## 2022-11-15 NOTE — DISCHARGE NOTE OB - CARE PROVIDER_API CALL
Shannan Israel)  OBSN  General [address]25  [address] [address]  Phone: [phone]  Fax: [phone]  Follow Up Time:

## 2022-11-15 NOTE — DISCHARGE NOTE OB - CARE PLAN
Principal Discharge DX:	 delivery delivered  Assessment and plan of treatment:	follow up in 2 weeks for incision check. follow up in 6 weeks for PPV. pelvic rest. activity as tolerated. regular diet.   1

## 2022-11-15 NOTE — DISCHARGE NOTE OB - CARE PROVIDERS DIRECT ADDRESSES
[email or web address] includes the [facility]

## 2022-11-15 NOTE — DISCHARGE NOTE OB - HOSPITAL COURSE
yes... admitted for labor induction.  delivery for non-reassuring tracing. routine post-operative course

## 2022-11-15 NOTE — DISCHARGE NOTE OB - PLAN OF CARE
follow up in 2 weeks for incision check. follow up in 6 weeks for PPV. pelvic rest. activity as tolerated. regular diet.

## 2022-11-15 NOTE — DISCHARGE NOTE OB - PATIENT PORTAL LINK FT
You can access the FollowMyHealth Patient Portal offered by [facility] by registering at the following website: [email or web address]. By joining [employer]’s FollowMyHealth portal, you will also be able to view your health information using other applications (apps) compatible with our system.

## 2022-11-15 NOTE — PROGRESS NOTE ADULT - ATTENDING COMMENTS
pt seen and examined. agree with above.  POD #3, s/p CD, meeting goals, no complaintss  VSS, NAD  FF and below umb. inc c/d/i  pain controlled with motrin and tylenol.  ambulation encouraged.  baby well, breast  feeding  precautions reviewed  d/c home. f/u 1-2 wks for incision check, 6 weeks for PPV
OB Attg:  Pt seen and assessed. I agree with above assessment and plan.  MONO Hearn MD

## 2022-11-15 NOTE — PROGRESS NOTE ADULT - SUBJECTIVE AND OBJECTIVE BOX
Day 1 of Anesthesia Pain Management Service    SUBJECTIVE:  Pain Scale Score:          [X] Refer to charted pain scores    THERAPY:    s/p neuraxial PF morphine    MEDICATIONS  (STANDING):  acetaminophen     Tablet .. 975 milliGRAM(s) Oral <User Schedule>  diphtheria/tetanus/pertussis (acellular) Vaccine (Adacel) 0.5 milliLiter(s) IntraMuscular once  heparin   Injectable 5000 Unit(s) SubCutaneous every 12 hours  ibuprofen  Tablet. 600 milliGRAM(s) Oral every 6 hours  ketorolac   Injectable 30 milliGRAM(s) IV Push every 6 hours  lactated ringers. 1000 milliLiter(s) (125 mL/Hr) IV Continuous <Continuous>  levothyroxine 100 MICROGram(s) Oral <User Schedule>  levothyroxine 150 MICROGram(s) Oral <User Schedule>  morphine PF Epidural 2 milliGRAM(s) Epidural once  oxytocin Infusion 333.333 milliUNIT(s)/Min (1000 mL/Hr) IV Continuous <Continuous>  oxytocin Infusion 333.333 milliUNIT(s)/Min (1000 mL/Hr) IV Continuous <Continuous>  oxytocin Infusion. 2 milliUNIT(s)/Min (2 mL/Hr) IV Continuous <Continuous>  sodium chloride 0.9%. 1000 milliLiter(s) (125 mL/Hr) IV Continuous <Continuous>    MEDICATIONS  (PRN):  dexAMETHasone  Injectable 4 milliGRAM(s) IV Push every 6 hours PRN Nausea  diphenhydrAMINE 25 milliGRAM(s) Oral every 6 hours PRN Pruritus  lanolin Ointment 1 Application(s) Topical every 6 hours PRN Sore Nipples  magnesium hydroxide Suspension 30 milliLiter(s) Oral two times a day PRN Constipation  nalbuphine Injectable 2.5 milliGRAM(s) IV Push every 6 hours PRN Pruritus  naloxone Injectable 0.1 milliGRAM(s) IV Push every 3 minutes PRN For ANY of the following changes in patient status:  A. Breaths Per Minute LESS THAN 10, B. Oxygen saturation LESS THAN 90%, C. Sedation score of 6 for Stop After: 4 Times  ondansetron Injectable 4 milliGRAM(s) IV Push every 6 hours PRN Nausea  oxyCODONE    IR 5 milliGRAM(s) Oral every 3 hours PRN Mild Pain (1 - 3)  oxyCODONE    IR 10 milliGRAM(s) Oral every 3 hours PRN Moderate Pain (4 - 6)  oxyCODONE    IR 5 milliGRAM(s) Oral every 3 hours PRN Moderate to Severe Pain (4-10)  oxyCODONE    IR 5 milliGRAM(s) Oral once PRN Moderate to Severe Pain (4-10)  simethicone 80 milliGRAM(s) Chew every 4 hours PRN Gas      OBJECTIVE:    Sedation:        	[X] Alert	[ ] Drowsy	[ ] Arousable      [ ] Asleep       [ ] Unresponsive    Side Effects:	[X] None	[ ] Nausea	[ ] Vomiting         [ ] Pruritus  		[ ] Weakness            [ ] Numbness	          [ ] Other:    Vital Signs Last 24 Hrs  T(C): 36.6 (13 Nov 2022 13:03), Max: 37.2 (13 Nov 2022 03:10)  T(F): 97.9 (13 Nov 2022 13:03), Max: 98.9 (13 Nov 2022 03:10)  HR: 83 (13 Nov 2022 13:03) (61 - 194)  BP: 93/62 (13 Nov 2022 13:03) (93/62 - 137/60)  BP(mean): 87 (13 Nov 2022 02:00) (72 - 109)  RR: 17 (13 Nov 2022 13:03) (16 - 18)  SpO2: 100% (13 Nov 2022 13:03) (67% - 100%)    Parameters below as of 13 Nov 2022 13:03  Patient On (Oxygen Delivery Method): room air        ASSESSMENT/ PLAN  [X] Patient transitioned to prn analgesics  [X] Pain management per primary service, pain service to sign off   [X]Documentation and Verification of current medications
Postpartum Note-  Section POD#1    Allergies: No Known Allergies    Blood type: B positive  Rubella: Immune  RPR: Negative     S: Patient is a 37y  POD#1 s/p pLTCS for category II tracing.   Patient w/o complaints, pain is controlled.  Pt is OOB, tolerating PO, passing flatus. Lochia WNL.     O:  Vital Signs Last 24 Hrs  T(C): 36.6 (2022 05:22), Max: 37.2 (2022 03:10)  T(F): 97.9 (2022 05:22), Max: 98.9 (2022 03:10)  HR: 71 (2022 05:22) (58 - 194)  BP: 102/58 (2022 05:22) (91/55 - 137/60)  BP(mean): 87 (2022 02:00) (72 - 109)  RR: 17 (2022 06:43) (16 - 18)  SpO2: 96% (2022 05:22) (67% - 100%)    Parameters below as of 2022 05:22  Patient On (Oxygen Delivery Method): room air      I&O's Summary    2022 07:01  -  2022 07:00  --------------------------------------------------------  IN: 5359.8 mL / OUT: 3250 mL / NET: 2109.8 mL        Gen: NAD  Abdomen: Soft, nontender, non-distended, fundus firm.  Incision: Clean, dry, and intact.  Negative erythema/edema/ecchymosis. Sub Q  Lochia WNL  Ext: PAS in place. Negative Homans B/L    LABS:                          10.3   15.64 )-----------( 181      ( 2022 06:07 )             32.1       A/P:  37y POD#1 s/p pLTCS, doing well.      PMHx: Hypothyroidism  Current Issues: None    Increase OOB  DVT ppx  Dressing removed  Due to void  Regular diet  AM CBC stable  Continue routine post op care and pain protocol    Nurys Carrasquillo PA-C      
Postpartum Note-  Section POD#2    Allergies: No Known Allergies    Blood type: B positive  Rubella: Immune  RPR: Negative     S:  Patient w/o complaints, pain is controlled.  Pt is OOB, tolerating PO, passing flatus. Lochia WNL.     O:  Vital Signs Last 24 Hrs  T(C): 37.1 (2022 05:08), Max: 37.1 (2022 05:08)  T(F): 98.7 (2022 05:08), Max: 98.7 (2022 05:08)  HR: 88 (2022 05:08) (68 - 90)  BP: 102/61 (2022 05:08) (93/62 - 119/69)  BP(mean): --  RR: 18 (2022 05:08) (17 - 18)  SpO2: 98% (2022 05:08) (98% - 100%)    Parameters below as of 2022 05:08  Patient On (Oxygen Delivery Method): room air      Gen: NAD  Abdomen: Soft, nontender, mildly distended-tympanic, fundus firm.  Incision: Clean, dry, and intact.  Negative erythema/edema/ecchymosis. Sub Q-steri  Lochia WNL  Ext: Soft/NT B/L    LABS:                          10.3   15.64 )-----------( 181      ( 2022 06:07 )             32.1                 
PA POD # 3  C/S Note    Blood Type:  B+          Rubella:  Immune            RPR:  NR    Pain:  Controlled  Complaints:  None  Pt. is ambulating, Voiding, passing flatus, & tolerating regular diet.  Lochia:  WNL    VS:  T(C): 37.1 (11-15-22 @ 05:44), Max: 37.1 (11-15-22 @ 05:44)  HR: 82 (11-15-22 @ 05:44) (82 - 88)  BP: 110/60 (11-15-22 @ 05:44) (104/60 - 115/75)  RR: 17 (11-15-22 @ 05:44) (17 - 17)  SpO2: 99% (11-15-22 @ 05:44) (98% - 99%)  Complete Blood Count + Automated Diff (11.13.22 @ 06:07)    WBC Count: 15.64 K/uL     Hemoglobin: 10.3 g/dL    Hematocrit: 32.1 %    Platelet Count - Automated: 181 K/uL      Abd:  Soft, non-distended, non-tender            Fundus-firm            Incision- C/D/I-SQ with steri strips  Extremities:  Edema - none                     Calf Tenderness - none      Assessment:    37 y.o. G 2 P 1011      POD # 3  S/P Primary C/S  for CAT 2 FHT in stable condition.    PMHx significant for:  Polypectomy, Hypthyroidism  Current Issues:  None  Plan:  Increase OOB             Cont. Pain Protocol             Cont. Reg. Diet             Cont. PO Care.            Cont. DVT PPx            Cont. Synthroid    ETIA Jordan

## 2022-11-15 NOTE — DISCHARGE NOTE OB - MEDICATION SUMMARY - MEDICATIONS TO TAKE
I will START or STAY ON the medications listed below when I get home from the hospital:    acetaminophen 325 mg oral tablet  -- 3 tab(s) by mouth every 6 hours  -- Indication: For for pain    ibuprofen 600 mg oral tablet  -- 1 tab(s) by mouth every 6 hours  -- Indication: For for pain    levothyroxine 100 mcg (0.1 mg) oral tablet  -- 1 tab(s) by mouth 2 times a week    -- Indication: For for thyroid    levothyroxine 50 mcg (0.05 mg) oral tablet  -- 1 tab(s) by mouth once a day  -- Indication: For for thyroid

## 2022-11-18 ENCOUNTER — APPOINTMENT (OUTPATIENT)
Dept: OBGYN | Facility: CLINIC | Age: 37
End: 2022-11-18

## 2022-11-21 ENCOUNTER — APPOINTMENT (OUTPATIENT)
Dept: OBGYN | Facility: CLINIC | Age: 37
End: 2022-11-21

## 2022-11-21 VITALS
HEIGHT: 63 IN | BODY MASS INDEX: 27.82 KG/M2 | WEIGHT: 157 LBS | DIASTOLIC BLOOD PRESSURE: 72 MMHG | SYSTOLIC BLOOD PRESSURE: 114 MMHG

## 2022-11-21 PROCEDURE — 0503F POSTPARTUM CARE VISIT: CPT

## 2022-11-21 NOTE — HISTORY OF PRESENT ILLNESS
[Delivery Date: ___] : on [unfilled] [Female] : Delivery History: baby girl [Wt. ___] : weighing [unfilled] [Breastfeeding] : currently nursing [de-identified] : Dr Israel [FreeTextEntry1] : Post-partum visit - initial visi \par Delivery details: CD for NRFHT by SA\par  issues: IUGR\par Delivery complications: none\par \par Current symptoms and complaints: no acute complaints. normal bowel./bladder fxn. light lochia.\par breast feeding/pumping. occasional formula. no mastitis\par no PPD sx. \par  \par \par Exam:\par Incision well healed, C/D/I\par \par no abdominal pain or tenderness \par  \par Assessment: \par s/p CD, doing well\par \par Plan:\par Lactation consultatnt referral\par mastitis precautions reviewed\par pelvic rest\par  \par RTO 4 weeks for full post partum visit \par  \par \par \par

## 2022-12-01 ENCOUNTER — NON-APPOINTMENT (OUTPATIENT)
Age: 37
End: 2022-12-01

## 2022-12-19 ENCOUNTER — APPOINTMENT (OUTPATIENT)
Dept: OBGYN | Facility: CLINIC | Age: 37
End: 2022-12-19

## 2022-12-19 VITALS
WEIGHT: 157 LBS | SYSTOLIC BLOOD PRESSURE: 102 MMHG | BODY MASS INDEX: 26.8 KG/M2 | DIASTOLIC BLOOD PRESSURE: 69 MMHG | HEIGHT: 64 IN

## 2022-12-19 PROCEDURE — 0503F POSTPARTUM CARE VISIT: CPT

## 2022-12-19 NOTE — END OF VISIT
[FreeTextEntry3] : I, Shaka Cheek, acted as a scribe on behalf of Dr. Greg Stratton on 09/19/2022 .\par \par All medical entries made by the scribe were at my, Dr. Greg Stratton's, direction and personally dictated by me on 09/19/2022. I have reviewed the chart and agree that the record accurately reflects my personal performance of the history, physical exam, assessment and plan. I have also personally directed, reviewed, and agreed with the chart.

## 2022-12-19 NOTE — HISTORY OF PRESENT ILLNESS
[Delivery Date: ___] : on [unfilled] [Primary C/S] : delivered by  section [Female] : Delivery History: baby girl [Wt. ___] : weighing [unfilled] [Breastfeeding] : currently nursing [Clean/Dry/Intact] : clean, dry and intact [Healed] : healed [Doing Well] : is doing well [No Sign of Infection] : is showing no signs of infection [Excellent Pain Control] : has excellent pain control [Back to Normal] : is back to normal in size [None] : no vaginal bleeding [Examination Of The Breasts] : breasts are normal [Chills] : no chills [Fever] : no fever [Nausea] : no nausea [Erythema] : not erythematous [Swelling] : not swollen [Dehiscence] : not dehisced [Normal] : the vagina was normal [FreeTextEntry9] : IUGR [de-identified] : primary c/s 22 for baby girl 5lbs 8oz by SA.  delivery for NRFHT. [de-identified] : Breastfeeding. Denies mastitis sxs. Denies ppd sxs. Reports normal bowel/bladder fxn. [de-identified] : abd soft, nt, nd [de-identified] : 37 s/p c/s for NRFHT [de-identified] : contraceptive counseling, rto 3 months [FreeTextEntry1] : nml 6 week Post c/s check\par discussed IUd and contraception

## 2023-03-13 ENCOUNTER — APPOINTMENT (OUTPATIENT)
Dept: OBGYN | Facility: CLINIC | Age: 38
End: 2023-03-13

## 2023-11-14 NOTE — OB RN PATIENT PROFILE - TEACHING/LEARNING FACTORS IMPACT ABILITY TO LEARN
Back pain under right scapula x 24 hrs taking advil prn with little relief. Pt visit chiropractor weekly for adjustments & has not had an apt in 3 weeks. No fall or injuries.  
none

## 2023-12-31 PROBLEM — Z11.3 ENCOUNTER FOR SCREENING EXAMINATION FOR SEXUALLY TRANSMITTED DISEASE: Status: RESOLVED | Noted: 2022-08-23 | Resolved: 2022-09-06

## 2024-04-03 NOTE — OB NEONATOLOGY/PEDIATRICIAN DELIVERY SUMMARY - NS_[facility]EFFORT_OBGYN_ALL_OB
CPM/PAT Evaluation       Name: Bonnie Jules (Bonnie Jules)  /Age: 1942/81 y.o.     In-Person       Chief Complaint: Right knee pain    HPI 81-year-old female complains of right knee pain, swelling and difficulty.  Patient states has gotten progressively worse over the past year.  Injections are no longer helpful.  Patient has history of coronary artery disease with CABG in .  She has history of peripheral vascular disease, hypothyroidism, anxiety, CLL, chronic kidney disease stage III, hypertension, sleep apnea, diabetes mellitus and osteoporosis.  She denies chest pain, shortness of breath, fevers or chills.  Cardiac clearance 2024 by Dr. Araujo.  She is scheduled for right total knee replacement 2020    Past Medical History:   Diagnosis Date    Anxiety     Chronic ischemic colitis (CMS/HCC)     With history of sepsis and infectious gastroenteritis 10/7/2022-10/11/2022    Chronic kidney disease, stage III (moderate) (CMS/HCC)     CLL (chronic lymphocytic leukemia) (CMS/HCC)     Coronary artery disease     CTS (carpal tunnel syndrome)     Diabetes mellitus (CMS/HCC)     GI bleed     Hypertension     Hypothyroidism     Osteoarthritis     Osteoporosis     Peripheral vascular disease (CMS/HCC)     Seizure disorder (CMS/HCC)        Past Surgical History:   Procedure Laterality Date    CARPAL TUNNEL RELEASE Bilateral     CATARACT EXTRACTION W/ INTRAOCULAR LENS  IMPLANT, BILATERAL      Left 8/10/2017, right 2017    CHOLECYSTECTOMY      COLONOSCOPY      CORONARY ARTERY BYPASS GRAFT  2005    X 3    HYSTERECTOMY      OTHER SURGICAL HISTORY      Bilateral heel spurs    OTHER SURGICAL HISTORY Right 2016    Femoral endarterectomy    TOTAL HIP ARTHROPLASTY Left        Patient Sexual activity questions deferred to the physician.    Family History   Problem Relation Name Age of Onset    Diabetes Mother      Diabetes Daughter      Other (RA) Daughter         Allergies   Allergen Reactions     Quinolones Rash    Chlorpheniramine-Dextromethorp Hives    Amoxicillin Hives    Amoxicillin-Pot Clavulanate Rash    Ciprofloxacin Rash    Levofloxacin Rash       Current Outpatient Medications   Medication Instructions    acetaminophen (Tylenol) 500 mg capsule 2 tablets, oral, Daily    aluminum-magnesium hydroxide 200-200 mg/5 mL suspension 10 mL, oral, Every 6 hours PRN    amLODIPine (NORVASC) 10 mg, oral, Daily    aspirin 81 mg chewable tablet 1 tablet, oral, Nightly    [START ON 4/16/2024] chlorhexidine (Peridex) 0.12 % solution 15 mL, Mouth/Throat, As needed, Use cap to measure 15 mL.  Swish/gargle mouthwash for at least 30 seconds.  Do not swallow.  Use night before surgery after brushing teeth and morning of surgery after brushing teeth.    cholecalciferol (Vitamin D-3) 50 mcg (2,000 unit) capsule 1 capsule, oral, Daily    cyanocobalamin (Vitamin B-12) 1,000 mcg tablet 1 tablet, oral, Daily    folic acid (Folvite) 1 mg tablet 1 tablet, oral, Daily    gabapentin (Neurontin) 100 mg capsule 1 capsule, oral, 2 times daily Nitrate, In the morning and before bedtime     hydrALAZINE (APRESOLINE) 50 mg, oral, 2 times daily    hydroCHLOROthiazide (HYDRODIURIL) 25 mg, oral, Daily    insulin degludec (TRESIBA FLEXTOUCH U-100) 11 Units, subcutaneous, Every morning, As directed 1 box     insulin lispro (HUMALOG KARLIE KWIKPEN U-100) 30 Units, subcutaneous, 3 times daily with meals, ON A SLIDING SCALE    levothyroxine (Synthroid, Levoxyl) 100 mcg tablet 1 tablet, oral, Daily    lisinopril 40 mg, oral, Daily    metoprolol tartrate (LOPRESSOR) 12.5 mg, oral, 2 times daily    nitroglycerin (NITROSTAT) 0.4 mg, sublingual, Every 5 min PRN, Every 5 minutes x 3 doses prn for chest pain    POLYETHYLENE GLYCOL 3350 ORAL 1 packet, oral, Daily, With 8 oz of fluid     pravastatin (PRAVACHOL) 40 mg, oral, Daily    sulfaSALAzine (Azulfidine) 500 mg DR tablet 2 tablets, oral, 2 times daily    valACYclovir (VALTREX) 1,000 mg, oral, As  "needed, Twice daily      Review of Systems   All other systems reviewed and are negative.       Physical Exam  Vitals reviewed. Physical exam within normal limits.   Constitutional:       Appearance: Normal appearance.   HENT:      Head: Normocephalic and atraumatic.   Neck:      Vascular: No carotid bruit.   Cardiovascular:      Rate and Rhythm: Normal rate and regular rhythm.      Heart sounds: Murmur heard.   Pulmonary:      Effort: Pulmonary effort is normal.      Breath sounds: Normal breath sounds.   Abdominal:      General: Bowel sounds are normal.      Palpations: Abdomen is soft.   Musculoskeletal:         General: Normal range of motion.      Cervical back: Normal range of motion.      Right lower leg: Edema present.      Left lower leg: Edema present.   Lymphadenopathy:      Cervical: No cervical adenopathy.   Skin:     General: Skin is warm and dry.   Neurological:      General: No focal deficit present.      Mental Status: She is alert and oriented to person, place, and time.      Gait: Gait abnormal.   Psychiatric:         Mood and Affect: Mood normal.         Behavior: Behavior normal.         Thought Content: Thought content normal.         Judgment: Judgment normal.        PAT AIRWAY:   Airway:     Mallampati::  IV    Neck ROM::  Full   upper dentures and lower dentures      Vitals  Heart Rate:  [71]   Temp:  [37 °C (98.6 °F)]   BP: (120)/(93)   Height:  [149.9 cm (4' 11\")]   Weight:  [57.8 kg (127 lb 6.4 oz)]   SpO2:  [99 %]        DASI Risk Score      Flowsheet Row Most Recent Value   DASI SCORE 29.45   METS Score (Will be calculated only when all the questions are answered) 6.4          Caprini DVT Assessment      Flowsheet Row Most Recent Value   DVT Score 16   Current Status Swollen legs, Elective major lower extremity arthroplasty   History Prior major surgery, Previous malignancy, Acute myocardial infarction, Sepsis, Inflammatory bowel disease   Age Over 75 years   BMI 30 or less      "     Modified Frailty Index    No data to display       CHADS2 Stroke Risk  Current as of 2 hours ago        N/A 3 - 100%: High Risk   2 - 3%: Medium Risk   0 - 2%: Low Risk     Last Change: N/A          This score determines the patient's risk of having a stroke if the patient has atrial fibrillation.        This score is not applicable to this patient. Components are not calculated.          Revised Cardiac Risk Index      Flowsheet Row Most Recent Value   Revised Cardiac Risk Calculator 2          Apfel Simplified Score      Flowsheet Row Most Recent Value   Apfel Simplified Score Calculator 2          Risk Analysis Index Results This Encounter    No data found in the last 1 encounters.       Stop Bang Score      Flowsheet Row Most Recent Value   Do you snore loudly? 0   Do you often feel tired or fatigued after your sleep? 0   Has anyone ever observed you stop breathing in your sleep? 0   Do you have or are you being treated for high blood pressure? 1   Recent BMI (Calculated) 25.7   Is BMI greater than 35 kg/m2? 0=No   Age older than 50 years old? 1=Yes   Is your neck circumference greater than 17 inches (Male) or 16 inches (Female)? 0   Gender - Male 0=No   STOP-BANG Total Score 2            Assessment and Plan:   -Osteoarthritis right knee     Plan: Right total knee replacement  -CAD, s/p CABG  -Hypertension, stable on lisinopril, amlodipine  -Hypothyroidism, stable on Synthroid  -Insulin-dependent diabetes  -Chronic ischemic colitis, stable on sulfasalazine  -Hyperlipidemia stable on Pravachol  -CLL  -Chronic kidney disease stage III  -ASA 3   Caprini DVT score 16   STOP-BANG total score 2   CHADS2 score 3   DASI score 29.45   METS score 6.4   RCRI score 2   Apfel score 2   ARISCAT score 49 = 42.1% high risk postop pulmonary complication  -Labs 2/20/2024 reviewed   Bulb Maksim-Pharynx Suction Only

## 2024-06-04 ENCOUNTER — NON-APPOINTMENT (OUTPATIENT)
Age: 39
End: 2024-06-04

## 2024-06-04 ENCOUNTER — APPOINTMENT (OUTPATIENT)
Dept: OBGYN | Facility: CLINIC | Age: 39
End: 2024-06-04
Payer: COMMERCIAL

## 2024-06-04 VITALS
HEIGHT: 64 IN | WEIGHT: 153 LBS | BODY MASS INDEX: 26.12 KG/M2 | SYSTOLIC BLOOD PRESSURE: 123 MMHG | DIASTOLIC BLOOD PRESSURE: 80 MMHG

## 2024-06-04 DIAGNOSIS — Z11.59 ENCOUNTER FOR SCREENING FOR OTHER VIRAL DISEASES: ICD-10-CM

## 2024-06-04 DIAGNOSIS — Z32.01 ENCOUNTER FOR PREGNANCY TEST, RESULT POSITIVE: ICD-10-CM

## 2024-06-04 DIAGNOSIS — Z34.01 ENCOUNTER FOR SUPERVISION OF NORMAL FIRST PREGNANCY, FIRST TRIMESTER: ICD-10-CM

## 2024-06-04 DIAGNOSIS — Z34.03 ENCOUNTER FOR SUPERVISION OF NORMAL FIRST PREGNANCY, THIRD TRIMESTER: ICD-10-CM

## 2024-06-04 DIAGNOSIS — Z36.9 ENCOUNTER FOR ANTENATAL SCREENING, UNSPECIFIED: ICD-10-CM

## 2024-06-04 DIAGNOSIS — N39.0 URINARY TRACT INFECTION, SITE NOT SPECIFIED: ICD-10-CM

## 2024-06-04 DIAGNOSIS — Z87.59 PERSONAL HISTORY OF OTHER COMPLICATIONS OF PREGNANCY, CHILDBIRTH AND THE PUERPERIUM: ICD-10-CM

## 2024-06-04 DIAGNOSIS — Z13.21 ENCOUNTER FOR SCREENING FOR NUTRITIONAL DISORDER: ICD-10-CM

## 2024-06-04 DIAGNOSIS — O34.12 MATERNAL CARE FOR BENIGN TUMOR OF CORPUS UTERI, SECOND TRIMESTER: ICD-10-CM

## 2024-06-04 DIAGNOSIS — Z87.42 PERSONAL HISTORY OF OTHER DISEASES OF THE FEMALE GENITAL TRACT: ICD-10-CM

## 2024-06-04 DIAGNOSIS — Z34.02 ENCOUNTER FOR SUPERVISION OF NORMAL FIRST PREGNANCY, SECOND TRIMESTER: ICD-10-CM

## 2024-06-04 DIAGNOSIS — D25.9 MATERNAL CARE FOR BENIGN TUMOR OF CORPUS UTERI, SECOND TRIMESTER: ICD-10-CM

## 2024-06-04 PROCEDURE — 99385 PREV VISIT NEW AGE 18-39: CPT

## 2024-06-04 NOTE — HISTORY OF PRESENT ILLNESS
[FreeTextEntry1] : The patient is here for a routine gynecological examination with Pap smear.  Her LMP was May 15  Periods are regular   She has been trying for pregnancy for 6 months        She has no recent gynecological or urinary problems

## 2024-06-04 NOTE — DISCUSSION/SUMMARY
[FreeTextEntry1] : A healthy lifestyle can contribute to good health.  This involves maintaining good health habits and avoiding unhealthy activity (e.g. smoking, drugs, etc.)  Patient is counselled on a balanced diet, with Vitamin D supplement as needed.  Any activity is exercise and very important. Walking is encourage and should be brisk. as evidence shows that brisk walking is healthier for both body and brain.    Climbing stairs instead of elevators is good weight bearing exercise.  Dental care both at home and at the dentist office is important.  Rest at night of at least 6 hours is indicated.  The department of healthy lifestyle is available to help in creating good habits as well as dealing with problems  discuss fertility   suggest consult as she has not gotten pregnancy for six months and her age is now a factor

## 2024-06-04 NOTE — PHYSICAL EXAM
[Chaperone Present] : A chaperone was present in the examining room during all aspects of the physical examination [[id number]] : A chaperone was present during the pelvic exam. [Appropriately responsive] : appropriately responsive [Alert] : alert [No Acute Distress] : no acute distress [No Lymphadenopathy] : no lymphadenopathy [Regular Rate Rhythm] : regular rate rhythm [No Murmurs] : no murmurs [Clear to Auscultation B/L] : clear to auscultation bilaterally [Soft] : soft [Non-tender] : non-tender [Non-distended] : non-distended [No HSM] : No HSM [No Lesions] : no lesions [No Mass] : no mass [Oriented x3] : oriented x3 [Examination Of The Breasts] : a normal appearance [No Masses] : no breast masses were palpable [Labia Majora] : normal [Labia Minora] : normal [Normal] : normal [Enlarged ___ wks] : enlarged [unfilled] ~Uweeks [Uterine Adnexae] : normal

## 2024-06-05 LAB — HPV HIGH+LOW RISK DNA PNL CVX: NOT DETECTED

## 2024-06-09 LAB — CYTOLOGY CVX/VAG DOC THIN PREP: ABNORMAL

## 2024-06-19 ENCOUNTER — APPOINTMENT (OUTPATIENT)
Dept: HUMAN REPRODUCTION | Facility: CLINIC | Age: 39
End: 2024-06-19
Payer: COMMERCIAL

## 2024-06-19 PROCEDURE — 76830 TRANSVAGINAL US NON-OB: CPT

## 2024-06-19 PROCEDURE — 99215 OFFICE O/P EST HI 40 MIN: CPT | Mod: 25

## 2024-06-19 PROCEDURE — 36415 COLL VENOUS BLD VENIPUNCTURE: CPT

## 2024-06-21 ENCOUNTER — APPOINTMENT (OUTPATIENT)
Dept: FAMILY MEDICINE | Facility: CLINIC | Age: 39
End: 2024-06-21
Payer: COMMERCIAL

## 2024-06-21 VITALS
BODY MASS INDEX: 25.44 KG/M2 | SYSTOLIC BLOOD PRESSURE: 126 MMHG | WEIGHT: 149 LBS | HEIGHT: 64 IN | RESPIRATION RATE: 16 BRPM | HEART RATE: 77 BPM | TEMPERATURE: 98.6 F | DIASTOLIC BLOOD PRESSURE: 77 MMHG | OXYGEN SATURATION: 98 %

## 2024-06-21 DIAGNOSIS — E03.9 HYPOTHYROIDISM, UNSPECIFIED: ICD-10-CM

## 2024-06-21 DIAGNOSIS — Z00.00 ENCOUNTER FOR GENERAL ADULT MEDICAL EXAMINATION W/OUT ABNORMAL FINDINGS: ICD-10-CM

## 2024-06-21 DIAGNOSIS — Z82.49 FAMILY HISTORY OF ISCHEMIC HEART DISEASE AND OTHER DISEASES OF THE CIRCULATORY SYSTEM: ICD-10-CM

## 2024-06-21 DIAGNOSIS — E55.9 VITAMIN D DEFICIENCY, UNSPECIFIED: ICD-10-CM

## 2024-06-21 PROCEDURE — 99385 PREV VISIT NEW AGE 18-39: CPT

## 2024-06-21 RX ORDER — LEVOTHYROXINE SODIUM 0.09 MG/1
88 TABLET ORAL
Refills: 0 | Status: ACTIVE | COMMUNITY

## 2024-06-21 RX ORDER — LEVOTHYROXINE SODIUM 0.05 MG/1
50 TABLET ORAL
Refills: 0 | Status: DISCONTINUED | COMMUNITY
End: 2024-06-21

## 2024-06-21 RX ORDER — LEVOTHYROXINE SODIUM 0.09 MG/1
88 TABLET ORAL
Refills: 0 | Status: ACTIVE | COMMUNITY
Start: 2024-06-21

## 2024-06-21 RX ORDER — ERGOCALCIFEROL 1.25 MG/1
1.25 MG CAPSULE, LIQUID FILLED ORAL
Qty: 8 | Refills: 0 | Status: ACTIVE | COMMUNITY
Start: 2024-06-21 | End: 1900-01-01

## 2024-06-21 NOTE — HEALTH RISK ASSESSMENT
[Very Good] : ~his/her~  mood as very good [No] : No [0] : 2) Feeling down, depressed, or hopeless: Not at all (0) [PHQ-2 Negative - No further assessment needed] : PHQ-2 Negative - No further assessment needed [[id number]] : 0 [Patient reported PAP Smear was normal] : Patient reported PAP Smear was normal [# of Members in Household ___] :  household currently consist of [unfilled] member(s) [Employed] : employed [Graduate School] : graduate school [] :  [# Of Children ___] : has [unfilled] children [Sexually Active] : sexually active [Feels Safe at Home] : Feels safe at home [Fully functional (bathing, dressing, toileting, transferring, walking, feeding)] : Fully functional (bathing, dressing, toileting, transferring, walking, feeding) [PapSmearDate] : 06/24 [FreeTextEntry2] : pharmacy  [Never] : Never

## 2024-06-21 NOTE — PLAN
[FreeTextEntry1] : 1. CPE  - pap utd  - lipids  - all other blood work cbc/cmp/vitd/a1c/tsh reviewed -   2. vit d def - start vit d

## 2024-06-21 NOTE — HISTORY OF PRESENT ILLNESS
[FreeTextEntry1] : NP - CPE  [de-identified] : 37 yo F PMHx Hypothyroidism presenting to establish care. feels well.  had issues with infertility a few years ago. had miscarriage x 1 and d/c. went to fertility specialist and was found to have hypothyroidism. was corrected with Synthroid and she was able to conceive in 2022 and gave birth to healthy baby girl. she has established care with endocrinology. she is now trying to conceive again, TSH was low 0.05 last week on blood work and she was recommended to dec synthroid from 100 mcg daily to 88 mcg daily and extra 1/2 tab every other sunday. she is due for follow up in 8 weeks.  she was also found to have hgb 10.9 on blood work. she used to be on iron supplements during her pregnancy. she has iron supplements at home but she is not good about taking them.  she is currently taking prenatal vitamins.  her vitamin d levels were low at 15.

## 2024-06-23 LAB
CHOLEST SERPL-MCNC: 199 MG/DL
HDLC SERPL-MCNC: 45 MG/DL
LDLC SERPL CALC-MCNC: 121 MG/DL
NONHDLC SERPL-MCNC: 154 MG/DL
TRIGL SERPL-MCNC: 184 MG/DL

## 2024-06-24 ENCOUNTER — APPOINTMENT (OUTPATIENT)
Dept: HUMAN REPRODUCTION | Facility: CLINIC | Age: 39
End: 2024-06-24
Payer: COMMERCIAL

## 2024-06-24 PROCEDURE — 36415 COLL VENOUS BLD VENIPUNCTURE: CPT

## 2024-06-25 ENCOUNTER — APPOINTMENT (OUTPATIENT)
Dept: HUMAN REPRODUCTION | Facility: CLINIC | Age: 39
End: 2024-06-25

## 2024-06-25 PROCEDURE — 99214 OFFICE O/P EST MOD 30 MIN: CPT | Mod: 25

## 2024-06-25 PROCEDURE — 58340 CATHETER FOR HYSTEROGRAPHY: CPT

## 2024-06-25 PROCEDURE — 76831 ECHO EXAM UTERUS: CPT

## 2024-06-25 PROCEDURE — 58999I: CUSTOM

## 2024-08-03 ENCOUNTER — APPOINTMENT (OUTPATIENT)
Dept: FAMILY MEDICINE | Facility: CLINIC | Age: 39
End: 2024-08-03

## 2024-08-07 PROBLEM — T75.3XXA MOTION SICKNESS: Status: ACTIVE | Noted: 2024-08-07

## 2024-08-23 ENCOUNTER — RX RENEWAL (OUTPATIENT)
Age: 39
End: 2024-08-23

## 2024-10-07 ENCOUNTER — APPOINTMENT (OUTPATIENT)
Dept: FAMILY MEDICINE | Facility: CLINIC | Age: 39
End: 2024-10-07
Payer: COMMERCIAL

## 2024-10-07 VITALS
TEMPERATURE: 98.1 F | SYSTOLIC BLOOD PRESSURE: 110 MMHG | WEIGHT: 149 LBS | HEIGHT: 64 IN | BODY MASS INDEX: 25.44 KG/M2 | RESPIRATION RATE: 16 BRPM | HEART RATE: 61 BPM | DIASTOLIC BLOOD PRESSURE: 80 MMHG | OXYGEN SATURATION: 98 %

## 2024-10-07 DIAGNOSIS — M25.512 PAIN IN LEFT SHOULDER: ICD-10-CM

## 2024-10-07 DIAGNOSIS — M77.12 LATERAL EPICONDYLITIS, LEFT ELBOW: ICD-10-CM

## 2024-10-07 DIAGNOSIS — E03.9 HYPOTHYROIDISM, UNSPECIFIED: ICD-10-CM

## 2024-10-07 DIAGNOSIS — E55.9 VITAMIN D DEFICIENCY, UNSPECIFIED: ICD-10-CM

## 2024-10-07 DIAGNOSIS — M77.10 LATERAL EPICONDYLITIS, UNSPECIFIED ELBOW: ICD-10-CM

## 2024-10-07 DIAGNOSIS — Z13.1 ENCOUNTER FOR SCREENING FOR DIABETES MELLITUS: ICD-10-CM

## 2024-10-07 PROCEDURE — 99214 OFFICE O/P EST MOD 30 MIN: CPT

## 2024-10-07 RX ORDER — LEVOTHYROXINE SODIUM 0.09 MG/1
88 TABLET ORAL
Qty: 90 | Refills: 0 | Status: ACTIVE | COMMUNITY
Start: 2024-10-07

## 2024-10-19 LAB
25(OH)D3 SERPL-MCNC: 33.8 NG/ML
ALBUMIN SERPL ELPH-MCNC: 4.5 G/DL
ALP BLD-CCNC: 63 U/L
ALT SERPL-CCNC: 16 U/L
ANION GAP SERPL CALC-SCNC: 12 MMOL/L
AST SERPL-CCNC: 14 U/L
BASOPHILS # BLD AUTO: 0.06 K/UL
BASOPHILS NFR BLD AUTO: 0.9 %
BILIRUB SERPL-MCNC: 0.3 MG/DL
BUN SERPL-MCNC: 9 MG/DL
CALCIUM SERPL-MCNC: 9.2 MG/DL
CHLORIDE SERPL-SCNC: 103 MMOL/L
CHOLEST SERPL-MCNC: 231 MG/DL
CO2 SERPL-SCNC: 23 MMOL/L
CREAT SERPL-MCNC: 0.62 MG/DL
EGFR: 116 ML/MIN/1.73M2
EOSINOPHIL # BLD AUTO: 0.08 K/UL
EOSINOPHIL NFR BLD AUTO: 1.2 %
ESTIMATED AVERAGE GLUCOSE: 108 MG/DL
GLUCOSE SERPL-MCNC: 94 MG/DL
HBA1C MFR BLD HPLC: 5.4 %
HCT VFR BLD CALC: 40.8 %
HDLC SERPL-MCNC: 55 MG/DL
HGB BLD-MCNC: 12.1 G/DL
IMM GRANULOCYTES NFR BLD AUTO: 0.3 %
LDLC SERPL CALC-MCNC: 163 MG/DL
LYMPHOCYTES # BLD AUTO: 1.85 K/UL
LYMPHOCYTES NFR BLD AUTO: 26.8 %
MAN DIFF?: NORMAL
MCHC RBC-ENTMCNC: 22.6 PG
MCHC RBC-ENTMCNC: 29.7 GM/DL
MCV RBC AUTO: 76.1 FL
MONOCYTES # BLD AUTO: 0.49 K/UL
MONOCYTES NFR BLD AUTO: 7.1 %
NEUTROPHILS # BLD AUTO: 4.41 K/UL
NEUTROPHILS NFR BLD AUTO: 63.7 %
NONHDLC SERPL-MCNC: 176 MG/DL
PLATELET # BLD AUTO: 274 K/UL
POTASSIUM SERPL-SCNC: 4.2 MMOL/L
PROT SERPL-MCNC: 7.4 G/DL
RBC # BLD: 5.36 M/UL
RBC # FLD: 16.9 %
SODIUM SERPL-SCNC: 139 MMOL/L
T4 FREE SERPL-MCNC: 1.3 NG/DL
TRIGL SERPL-MCNC: 79 MG/DL
TSH SERPL-ACNC: 0.36 UIU/ML
WBC # FLD AUTO: 6.91 K/UL

## 2024-11-19 NOTE — PRE-ANESTHESIA EVALUATION ADULT - WEIGHT IN LBS
Subjective   Patient ID: Patrick Rey is a 31 y.o. male who presents November 19, 2024 for chiropractic care.    (1/20) VPCY    Today, the patient rates their degree of pain as a 3 out of 10 on the numeric pain rating scale.     Patrick returns for continued chiropractic care. Today, he reports that he has been doing well. He states that he has been experiencing some tension in the shoulders but his neck has continued to do well. The patient denies any changes in health since his last encounter and will return in 1 month.      Objective   Physical Exam  Neurological:      General: No focal deficit present.      Mental Status: He is alert and oriented to person, place, and time.      Cranial Nerves: No dysarthria or facial asymmetry.      Sensory: Sensation is intact.      Motor: Motor function is intact.      Coordination: Coordination is intact.      Gait: Gait is intact.       Palpation of the following region(s) revealed:  Cervical: Scalenes bilateral, muscular hypertonicity.  Upper trapezius bilateral, muscular hypertonicity.  Levator scapulae bilateral, muscular hypertonicity.  Cervical paraspinals bilateral, muscular hypertonicity.  Thoracic: Thoracic paraspinals bilateral, muscular hypertonicity.  Rhomboids bilateral, muscular hypertonicity.  Pectoralis bilateral, muscular hypertonicity.  Latissimus dorsi bilateral, muscular hypertonicity.  Lumbar: Lumbar paraspinals bilateral, muscular hypertonicity.  Gluteal bilateral, muscular hypertonicity.    Assessment/Plan   Today's Treatment Included: Performed spinal manipulation to the following regions of segmental dysfunction identified on examination, using age-appropriate and injury specific force. Segmental Joint(s) Cervical : C2 C4 Segmental Joint(s) Thoracic : T4, T5, and T10 Segmental Joint(s) Lumbopelvic/Sacral SIJ : L2, L5/S1, R SIJ, and L SIJ    Chiropractic manipulation treatment techniques utilized: Treatment Techniques Used : Diversified CMT,  Pelvic drop table technique, and Pelvic blocking technique  Soft tissue mobilization techniques utilized during treatment: Soft Tissue Moblization Techniques: Active Release Technique, Pin and Stretch, and Manual Dynamic Stretching  Integrative Dry Needling (IDN) - Needles in/out:  14.    Soft-tissue mobilization was performed in the following areas:   Suboccipital bilateral, Cervical paraspinal mm. bilateral, Scalenes bilateral, Upper Trapezius bilateral, and Levator Scap. bilateral  Thoracic Paraspinal mm. bilateral, Middle Trapezius bilateral, Rhomboids bilateral, Pectoralis bilateral, Subscapularis bilateral, and Latissimus Dorsi bilateral  Lumbar Paraspinal mm. bilateral, Quadratus Lumborum bilateral, Gluteal mm. Glute. Max.  and Glute. Med. bilateral, and Piriformis bilateral    The patient tolerated today's treatment with little or no additional discomfort and was instructed to contact the office for questions or concerns.    143.9

## 2024-11-22 ENCOUNTER — APPOINTMENT (OUTPATIENT)
Dept: OBGYN | Facility: CLINIC | Age: 39
End: 2024-11-22
Payer: COMMERCIAL

## 2024-11-22 VITALS
WEIGHT: 151 LBS | DIASTOLIC BLOOD PRESSURE: 79 MMHG | HEIGHT: 64 IN | BODY MASS INDEX: 25.78 KG/M2 | HEART RATE: 71 BPM | SYSTOLIC BLOOD PRESSURE: 124 MMHG

## 2024-11-22 DIAGNOSIS — Z32.01 ENCOUNTER FOR PREGNANCY TEST, RESULT POSITIVE: ICD-10-CM

## 2024-11-22 DIAGNOSIS — N91.2 AMENORRHEA, UNSPECIFIED: ICD-10-CM

## 2024-11-22 DIAGNOSIS — R11.0 NAUSEA: ICD-10-CM

## 2024-11-22 PROCEDURE — 99213 OFFICE O/P EST LOW 20 MIN: CPT | Mod: 25

## 2024-11-22 PROCEDURE — 76817 TRANSVAGINAL US OBSTETRIC: CPT

## 2024-11-22 PROCEDURE — 36415 COLL VENOUS BLD VENIPUNCTURE: CPT

## 2024-11-23 LAB
25(OH)D3 SERPL-MCNC: 24.9 NG/ML
B19V IGG SER QL IA: 3.63 INDEX
B19V IGG+IGM SER-IMP: NORMAL
B19V IGG+IGM SER-IMP: POSITIVE
B19V IGM FLD-ACNC: 0.17 INDEX
B19V IGM SER-ACNC: NEGATIVE
BASOPHILS # BLD AUTO: 0.06 K/UL
BASOPHILS NFR BLD AUTO: 0.7 %
EOSINOPHIL # BLD AUTO: 0.09 K/UL
EOSINOPHIL NFR BLD AUTO: 1 %
ESTIMATED AVERAGE GLUCOSE: 103 MG/DL
HBA1C MFR BLD HPLC: 5.2 %
HBV SURFACE AG SER QL: NONREACTIVE
HCT VFR BLD CALC: 40 %
HCV AB SER QL: NONREACTIVE
HCV S/CO RATIO: 0.15 S/CO
HGB BLD-MCNC: 12.5 G/DL
HIV1+2 AB SPEC QL IA.RAPID: NONREACTIVE
IMM GRANULOCYTES NFR BLD AUTO: 0.5 %
LYMPHOCYTES # BLD AUTO: 2.14 K/UL
LYMPHOCYTES NFR BLD AUTO: 24.2 %
MAN DIFF?: NORMAL
MCHC RBC-ENTMCNC: 23.9 PG
MCHC RBC-ENTMCNC: 31.3 G/DL
MCV RBC AUTO: 76.5 FL
MONOCYTES # BLD AUTO: 0.58 K/UL
MONOCYTES NFR BLD AUTO: 6.6 %
NEUTROPHILS # BLD AUTO: 5.93 K/UL
NEUTROPHILS NFR BLD AUTO: 67 %
PLATELET # BLD AUTO: 290 K/UL
RBC # BLD: 5.23 M/UL
RBC # FLD: 20 %
RUBV IGG FLD-ACNC: 13.1 INDEX
RUBV IGG SER-IMP: POSITIVE
T PALLIDUM AB SER QL IA: NEGATIVE
TSH SERPL-ACNC: 1.99 UIU/ML
VZV AB TITR SER: POSITIVE
VZV IGG SER IF-ACNC: 6.48 S/CO
WBC # FLD AUTO: 8.84 K/UL

## 2024-11-25 ENCOUNTER — NON-APPOINTMENT (OUTPATIENT)
Age: 39
End: 2024-11-25

## 2024-11-25 LAB
ABO + RH PNL BLD: NORMAL
BACTERIA UR CULT: NORMAL
BLD GP AB SCN SERPL QL: NORMAL
C TRACH RRNA SPEC QL NAA+PROBE: NOT DETECTED
HGB A MFR BLD: 97.6 %
HGB A2 MFR BLD: 2.4 %
HGB FRACT BLD-IMP: NORMAL
LEAD BLD-MCNC: <1 UG/DL
N GONORRHOEA RRNA SPEC QL NAA+PROBE: NOT DETECTED
SOURCE AMPLIFICATION: NORMAL

## 2024-12-20 ENCOUNTER — NON-APPOINTMENT (OUTPATIENT)
Age: 39
End: 2024-12-20

## 2024-12-24 ENCOUNTER — ASOB RESULT (OUTPATIENT)
Age: 39
End: 2024-12-24

## 2024-12-24 ENCOUNTER — APPOINTMENT (OUTPATIENT)
Dept: OBGYN | Facility: CLINIC | Age: 39
End: 2024-12-24
Payer: COMMERCIAL

## 2024-12-24 PROCEDURE — 36415 COLL VENOUS BLD VENIPUNCTURE: CPT

## 2024-12-24 PROCEDURE — 0500F INITIAL PRENATAL CARE VISIT: CPT

## 2024-12-24 PROCEDURE — 76801 OB US < 14 WKS SINGLE FETUS: CPT

## 2024-12-24 PROCEDURE — 76813 OB US NUCHAL MEAS 1 GEST: CPT

## 2024-12-24 PROCEDURE — 99214 OFFICE O/P EST MOD 30 MIN: CPT | Mod: 25

## 2024-12-26 LAB
T4 FREE SERPL-MCNC: 1.1 NG/DL
TSH SERPL-ACNC: 4.46 UIU/ML

## 2025-01-03 ENCOUNTER — TRANSCRIPTION ENCOUNTER (OUTPATIENT)
Age: 40
End: 2025-01-03

## 2025-01-07 ENCOUNTER — EMERGENCY (EMERGENCY)
Facility: HOSPITAL | Age: 40
LOS: 1 days | Discharge: HOME | End: 2025-01-07
Attending: STUDENT IN AN ORGANIZED HEALTH CARE EDUCATION/TRAINING PROGRAM
Payer: COMMERCIAL

## 2025-01-07 ENCOUNTER — NON-APPOINTMENT (OUTPATIENT)
Age: 40
End: 2025-01-07

## 2025-01-07 VITALS
TEMPERATURE: 98 F | RESPIRATION RATE: 16 BRPM | HEART RATE: 77 BPM | DIASTOLIC BLOOD PRESSURE: 71 MMHG | OXYGEN SATURATION: 99 % | SYSTOLIC BLOOD PRESSURE: 120 MMHG

## 2025-01-07 VITALS
RESPIRATION RATE: 18 BRPM | WEIGHT: 154.1 LBS | HEART RATE: 76 BPM | SYSTOLIC BLOOD PRESSURE: 137 MMHG | TEMPERATURE: 98 F | HEIGHT: 64 IN | OXYGEN SATURATION: 99 % | DIASTOLIC BLOOD PRESSURE: 88 MMHG

## 2025-01-07 LAB
ALBUMIN SERPL ELPH-MCNC: 3.9 G/DL — SIGNIFICANT CHANGE UP (ref 3.3–5)
ALP SERPL-CCNC: 57 U/L — SIGNIFICANT CHANGE UP (ref 40–120)
ALT FLD-CCNC: 15 U/L — SIGNIFICANT CHANGE UP (ref 10–45)
ANION GAP SERPL CALC-SCNC: 15 MMOL/L — SIGNIFICANT CHANGE UP (ref 5–17)
APTT BLD: 30.2 SEC — SIGNIFICANT CHANGE UP (ref 24.5–35.6)
AST SERPL-CCNC: 12 U/L — SIGNIFICANT CHANGE UP (ref 10–40)
BASOPHILS # BLD AUTO: 0.07 K/UL — SIGNIFICANT CHANGE UP (ref 0–0.2)
BASOPHILS NFR BLD AUTO: 0.5 % — SIGNIFICANT CHANGE UP (ref 0–2)
BILIRUB SERPL-MCNC: 0.1 MG/DL — LOW (ref 0.2–1.2)
BLD GP AB SCN SERPL QL: NEGATIVE — SIGNIFICANT CHANGE UP
BUN SERPL-MCNC: 8 MG/DL — SIGNIFICANT CHANGE UP (ref 7–23)
CALCIUM SERPL-MCNC: 8.8 MG/DL — SIGNIFICANT CHANGE UP (ref 8.4–10.5)
CHLORIDE SERPL-SCNC: 102 MMOL/L — SIGNIFICANT CHANGE UP (ref 96–108)
CO2 SERPL-SCNC: 19 MMOL/L — LOW (ref 22–31)
CREAT SERPL-MCNC: 0.51 MG/DL — SIGNIFICANT CHANGE UP (ref 0.5–1.3)
EGFR: 122 ML/MIN/1.73M2 — SIGNIFICANT CHANGE UP
EOSINOPHIL # BLD AUTO: 0.08 K/UL — SIGNIFICANT CHANGE UP (ref 0–0.5)
EOSINOPHIL NFR BLD AUTO: 0.5 % — SIGNIFICANT CHANGE UP (ref 0–6)
GLUCOSE SERPL-MCNC: 111 MG/DL — HIGH (ref 70–99)
HCG SERPL-ACNC: HIGH MIU/ML
HCT VFR BLD CALC: 39.3 % — SIGNIFICANT CHANGE UP (ref 34.5–45)
HGB BLD-MCNC: 12.5 G/DL — SIGNIFICANT CHANGE UP (ref 11.5–15.5)
IMM GRANULOCYTES NFR BLD AUTO: 0.5 % — SIGNIFICANT CHANGE UP (ref 0–0.9)
INR BLD: 0.97 RATIO — SIGNIFICANT CHANGE UP (ref 0.85–1.16)
LYMPHOCYTES # BLD AUTO: 16.7 % — SIGNIFICANT CHANGE UP (ref 13–44)
LYMPHOCYTES # BLD AUTO: 2.49 K/UL — SIGNIFICANT CHANGE UP (ref 1–3.3)
MCHC RBC-ENTMCNC: 25 PG — LOW (ref 27–34)
MCHC RBC-ENTMCNC: 31.8 G/DL — LOW (ref 32–36)
MCV RBC AUTO: 78.6 FL — LOW (ref 80–100)
MONOCYTES # BLD AUTO: 1.03 K/UL — HIGH (ref 0–0.9)
MONOCYTES NFR BLD AUTO: 6.9 % — SIGNIFICANT CHANGE UP (ref 2–14)
NEUTROPHILS # BLD AUTO: 11.15 K/UL — HIGH (ref 1.8–7.4)
NEUTROPHILS NFR BLD AUTO: 74.9 % — SIGNIFICANT CHANGE UP (ref 43–77)
NRBC # BLD: 0 /100 WBCS — SIGNIFICANT CHANGE UP (ref 0–0)
PLATELET # BLD AUTO: 265 K/UL — SIGNIFICANT CHANGE UP (ref 150–400)
POTASSIUM SERPL-MCNC: 3.5 MMOL/L — SIGNIFICANT CHANGE UP (ref 3.5–5.3)
POTASSIUM SERPL-SCNC: 3.5 MMOL/L — SIGNIFICANT CHANGE UP (ref 3.5–5.3)
PROT SERPL-MCNC: 7.1 G/DL — SIGNIFICANT CHANGE UP (ref 6–8.3)
PROTHROM AB SERPL-ACNC: 11.1 SEC — SIGNIFICANT CHANGE UP (ref 9.9–13.4)
RBC # BLD: 5 M/UL — SIGNIFICANT CHANGE UP (ref 3.8–5.2)
RBC # FLD: 16.7 % — HIGH (ref 10.3–14.5)
RH IG SCN BLD-IMP: POSITIVE — SIGNIFICANT CHANGE UP
SODIUM SERPL-SCNC: 136 MMOL/L — SIGNIFICANT CHANGE UP (ref 135–145)
WBC # BLD: 14.9 K/UL — HIGH (ref 3.8–10.5)
WBC # FLD AUTO: 14.9 K/UL — HIGH (ref 3.8–10.5)

## 2025-01-07 PROCEDURE — 86850 RBC ANTIBODY SCREEN: CPT

## 2025-01-07 PROCEDURE — 96374 THER/PROPH/DIAG INJ IV PUSH: CPT | Mod: XU

## 2025-01-07 PROCEDURE — 85610 PROTHROMBIN TIME: CPT

## 2025-01-07 PROCEDURE — 85025 COMPLETE CBC W/AUTO DIFF WBC: CPT

## 2025-01-07 PROCEDURE — 80053 COMPREHEN METABOLIC PANEL: CPT

## 2025-01-07 PROCEDURE — 85018 HEMOGLOBIN: CPT

## 2025-01-07 PROCEDURE — 82803 BLOOD GASES ANY COMBINATION: CPT

## 2025-01-07 PROCEDURE — 84132 ASSAY OF SERUM POTASSIUM: CPT

## 2025-01-07 PROCEDURE — 83605 ASSAY OF LACTIC ACID: CPT

## 2025-01-07 PROCEDURE — 86900 BLOOD TYPING SEROLOGIC ABO: CPT

## 2025-01-07 PROCEDURE — 84702 CHORIONIC GONADOTROPIN TEST: CPT

## 2025-01-07 PROCEDURE — 86901 BLOOD TYPING SEROLOGIC RH(D): CPT

## 2025-01-07 PROCEDURE — 84295 ASSAY OF SERUM SODIUM: CPT

## 2025-01-07 PROCEDURE — 82435 ASSAY OF BLOOD CHLORIDE: CPT

## 2025-01-07 PROCEDURE — 99285 EMERGENCY DEPT VISIT HI MDM: CPT

## 2025-01-07 PROCEDURE — 99284 EMERGENCY DEPT VISIT MOD MDM: CPT | Mod: 25

## 2025-01-07 PROCEDURE — 76801 OB US < 14 WKS SINGLE FETUS: CPT | Mod: 26

## 2025-01-07 PROCEDURE — 85730 THROMBOPLASTIN TIME PARTIAL: CPT

## 2025-01-07 PROCEDURE — 82947 ASSAY GLUCOSE BLOOD QUANT: CPT

## 2025-01-07 PROCEDURE — 81229 CYTOG ALYS CHRML ABNR SNPCGH: CPT

## 2025-01-07 PROCEDURE — 76801 OB US < 14 WKS SINGLE FETUS: CPT

## 2025-01-07 PROCEDURE — 82330 ASSAY OF CALCIUM: CPT

## 2025-01-07 PROCEDURE — 85014 HEMATOCRIT: CPT

## 2025-01-07 RX ORDER — ACETAMINOPHEN 80 MG/.8ML
1000 SOLUTION/ DROPS ORAL ONCE
Refills: 0 | Status: COMPLETED | OUTPATIENT
Start: 2025-01-07 | End: 2025-01-07

## 2025-01-07 RX ORDER — OXYCODONE HCL 15 MG
1 TABLET ORAL
Qty: 4 | Refills: 0
Start: 2025-01-07 | End: 2025-01-07

## 2025-01-07 RX ORDER — MISOPROSTOL 200 MCG
4 TABLET ORAL
Qty: 8 | Refills: 0
Start: 2025-01-07

## 2025-01-07 RX ADMIN — ACETAMINOPHEN 400 MILLIGRAM(S): 80 SOLUTION/ DROPS ORAL at 07:43

## 2025-01-07 NOTE — ED PROVIDER NOTE - NSFOLLOWUPINSTRUCTIONS_ED_ALL_ED_FT
You were seen and evaluated here in the emergency department due to vaginal bleeding.  Ultrasound findings were concerning for fetal demise and OB/GYN was consulted and you were seen by them here in the emergency department.     You may still have some bleeding at home.  2 medications were sent to your pharmacy.  These include Cytotec as well as oxycodone.  The oxycodone can make you sleepy so you should not operate machinery or drive a vehicle while taking this medication.  Take the oxycodone every 6 hours if needed.  Although you were seen here in the emergency department you should follow-up with your OB/GYN within the next 3-5 days to discuss what you are seen for here today and further evaluation.    Please return to the emergency department or seek immediate medical attention if you have any of the following symptoms.  These symptoms include but are not limited to: Fever, chest pain, shortness of breath, increased vaginal bleeding, increased abdominal tenderness, lightheadedness, passing out, inability to tolerate oral intake, nonstop vomiting.

## 2025-01-07 NOTE — ED PROVIDER NOTE - PROGRESS NOTE DETAILS
Patient's HCG 20,846.0 Pending transvaginal ultrasound.  -ALEXX Kraus MD PGY1 GYN saw the patient and says fetus was removed and the patient can be discharged home with outpatient follow-up.  GYN put in orders to pharmacy for Cytotec and Oxy. Discussed discharge with patient who is amenable went over symptoms look out for when to return to the emergency department which were well understood by patient and  in the room.  Discussed need to look for symptoms of bleeding and abdominal pain.  Patient will follow-up outpatient with her GYN. US c/f incomplete . Results discussed w/ patient and family at bedside by myself and AM attending Dr. Johnson, residents Dr. Kraus & Dr. Cui. Emotional support provided. Given patient [facility], will consult for management of miscarriage. S/o by myself at 7AM pending the above. -Gabrielle Butler MD (Attending)

## 2025-01-07 NOTE — ED PROVIDER NOTE - PHYSICAL EXAMINATION
Ana Kraus MD (PGY-1)  Physical Exam:    Gen: NAD, AOx3  Head: NCAT  HEENT: EOMI, PEERLA  Lung: CTAB, no respiratory distress, no wheezes/rhonchi/rales B/L  CV: RRR, no murmurs, rubs or gallops  Abd: soft, NT, ND, no guarding, no rigidity, no rebound tenderness, no CVA tenderness   MSK: no visible deformities, ROM normal in UE/LE, no back pain  Neuro: No focal sensory or motor deficits. Sensation intact to light touch all extremities.  Skin: Warm, well perfused, no rash, no leg swelling  Psych: normal affect, calm

## 2025-01-07 NOTE — ED PROVIDER NOTE - ATTENDING CONTRIBUTION TO CARE
I was the supervising attending. I have independently seen face-to-face and examined the patient with the resident. I have reviewed the history and physical and discussed the MDM with the resident. I agree with the assessment and plan as presented unless otherwise documented as follows:    39F hx , hx prior spontaneous , hypothyroidism, prior , presenting w/ vaginal bleeding. Currently 13 weeks pregnant, has recently had confirmatory US with IUP. Symptoms started at approximately 1AM, endorses multiple episodes of vaginal bleeding w/ passage of clots. Additionally endorses lower abdominal cramping. Otherwise denies LOC/lightheadedness, nausea/vomiting, fevers/chills, urinary complaints, CP, SOB. Appears well, AOx3, not in acute distress. Lungs CTABL, normal heart sounds, abdomen soft, diffuse lower abdominal TTP noted. DDx threatened/spontaneous  vs. subchorionic hemorrhage, less likely ectopic if recent reassuring US. Will obtain labs, US. -Gabrielle Butler MD (Attending)

## 2025-01-07 NOTE — CONSULT NOTE ADULT - ASSESSMENT
A/P: HPI:  39y  13w6d (LMP 10/3) presents with abdominal pain and heavy vaginal bleeding. bHCG on 25: [id number]. TVUS on 25 showing incomplete  with single IUP EGA of 13w5d located within the lower uterine segment thoracic proximal endocervical canal with absent fetal heart rate. Patient is hemodynamically stable, H/H 12.5/39.3. GYN consulted for management of miscarriage. Upon speculum exam, pregnancy was noted to be in the vagina. Fetus was manually removed, placenta unable to be removed due to patient intolerance of exam. Findings consistent with spontaneous .    #spontaneous   -D/W patient that she will have continuous heavy bleeding and will likely pass the placenta. Bleeding will likely lighten upon passing placenta and she may continue to bleed for up to 6 weeks. Patient given strict return precautions: return to ED or call OBGYN if patient experiences lightheadedness, dizziness, syncope.  -Patient to take buccal cytotec 800mcg at home to assist with passing rest of POC.  -May take tylenol, motrin, oxycodone for pain control. Oxy 5mg sent to pharmacy.  -F/U with OBGYN in 2 weeks.  -Fetal demise paperwork signed with pt.  -Fetus and POC sent to pathology.    D/W Dr. Nadiya Cardozo, PGY4  Nenita Gilbert, PGY1

## 2025-01-07 NOTE — ED PROVIDER NOTE - PATIENT PORTAL LINK FT
You can access the FollowMyHealth Patient Portal offered by [facility] by registering at the following website: [email or web address]. By joining [facility]’s FollowMyHealth portal, you will also be able to view your health information using other applications (apps) compatible with our system.

## 2025-01-07 NOTE — ED ADULT NURSE NOTE - OBJECTIVE STATEMENT
39y F AxO x4 who is  came in for vaginal bleeding. Patient is currently 13 weeks pregnant and reports she woke up at 0100 today with vaginal bleeding and abdominal cramping. Patient reports that she did noticed clots in the blood and has saturated more than one pad. Patient reports abdominal cramping in the LQs. Patient also was experiencing some nausea which has resolved. Patient has not seen an OBGYN yet. Denies fever, chills, headache, blurry vision, dizziness, v/d, dysuria, weakness, numbness, tingling, SOB, and chest pain.  is present at bedside. Call bell within reach, bed in lowest position.

## 2025-01-07 NOTE — ED PROVIDER NOTE - CLINICAL SUMMARY MEDICAL DECISION MAKING FREE TEXT BOX
This pregnant patient presents with vaginal with LMP of 10/03/2024. Patient had confirmed IUP and is . DDX includes ectopic, IUP, threatened/inevitable , along with completed . Patient is HDS and without a history of coagulopathy or infectious symptoms. Doubt alternate acute emergent pathology.    Plan: [facility], +/- basic labs, type and screen, TVUS, reassess

## 2025-01-07 NOTE — ED PROVIDER NOTE - NS ED ROS FT
GENERAL: No fever or chills  EYES: No change in vision  HEENT: No trouble swallowing or speaking  CARDIAC: No chest pain  PULMONARY: No cough or SOB  GI: +Abdominal pain. No nausea or no vomiting, no diarrhea or constipation  : +Vaginal Bleeding. No changes in urination  SKIN: No rashes  NEURO: No headache, no numbness  MSK: No joint pain  Otherwise as HPI or negative.

## 2025-01-07 NOTE — CONSULT NOTE ADULT - SUBJECTIVE AND OBJECTIVE BOX
***note not finalized    HPI:  39y  13w6d  (LMP 10/) presents with     No vaginal bleeding, vaginal discharge/irritation, abdominal pain/cramping  No lightheadedness, dizziness, chest pain, SOB, palpitations, nausea, vomiting, fevers, chills, urinary complaints, constipation, diarrhea    – OBHx:   – GynHx: denies  – PMH: denies  – PSH: denies  – Psych: denies   – Social: denies   – Meds: PNV   – Allergies: NKDA  – Will accept blood transfusions? Yes    Objective  – VS  T(C): 36.8 (25 @ 06:57)  HR: 69 (25 @ 06:57)  BP: 121/74 (25 @ 06:57)  RR: 18 (25 @ 06:57)  SpO2: 97% (25 @ 06:57)  – PE:   General: Resting comfortably, NAD  CV: Well perfused  Pulm: Nonlabored breathing  Abd: Normoactive bowel sounds. Soft, nondistendeded. Nontender to deep palpation. No rebound or guarding.  Extr: No swelling. No calf tenderness bilaterally.  – Spec:   – VE: CMT. No fundal tenderness. No adnexal tenderness. No fundal or adnexal masses palpated.    Pelvic exam performed with ED [place]    LABS:                        12.5   14.90 )-----------( 265      ( 2025 04:29 )             39.3         136  |  102  |  8   ----------------------------<  111[H]  3.5   |  19[L]  |  0.51    Ca    8.8      2025 04:29    TPro  7.1  /  Alb  3.9  /  TBili  0.1[L]  /  DBili  x   /  AST  12  /  ALT  15  /  AlkPhos  57      PT/INR - ( 2025 04:29 )   PT: 11.1 sec;   INR: 0.97 ratio         PTT - ( 2025 04:29 )  PTT:30.2 sec  Urinalysis Basic - ( 2025 04:29 )    Color: x / Appearance: x / SG: x / pH: x  Gluc: 111 mg/dL / Ketone: x  / Bili: x / Urobili: x   Blood: x / Protein: x / Nitrite: x   Leuk Esterase: x / RBC: x / WBC x   Sq Epi: x / Non Sq Epi: x / Bacteria: x        Blood Type: B Positive      RADIOLOGY & ADDITIONAL STUDIES:           ***note not finalized    HPI:  39y  13w6d (LMP 10/3) presents with abdominal pain and heavy vaginal bleeding. bHCG on 25: . TVUS on 25 showing incomplete  with single IUP EGA of 13w5d located within the lower uterine segment thoracic proximal endocervical canal with absent fetal heart rate. GYN consulted for management of miscarriage.    Patient reports she started experiencing abdominal discomfort described as gas pain around about 8 hrs ago (around 12AM). She subsequently noticed vaginal bleeding around an hour later. She reports soaking about 1-2 thick maxi pads/hr since the bleeding started. She shares that the bleeding has been about the same since it first started. She continues to endorse intermitted abdominal pain and cramping at this time, denies exacerbating factors. She reports alleviation in pain with IV tylenol given in the ED, did not take any OTC pain medications at home. Upon arriving to the ED, patient reported some nausea which she attributes to feeling overwhelmed, which has since resolved. No lightheadedness, dizziness, chest pain, SOB, palpitations, vomiting, fevers, chills, constipation, diarrhea.    OBGYN: Dr. Cam/Dr. Hearn  – OBHx: sAB @6wk in , C/S FT  for Cat II tracing, c/b IUGR.  – GynHx: h/o polyps s/p D&C polypectomy in , denies fibroids, ovarian cysts, endometriosis, abnormal pap smears, STI/STDs  – PMH: hypothyroidism  – PSH: D&C, polypectomy in   – Social: denies smoking, alcohol, drug use  – Meds: PNV, Levothyroxine 100mcg   – Allergies: NKDA    Objective  – VS  T(C): 36.8 (25 @ 06:57)  HR: 69 (25 @ 06:57)  BP: 121/74 (25 @ 06:57)  RR: 18 (25 @ 06:57)  SpO2: 97% (25 @ 06:57)  – PE:   General: Resting comfortably, NAD  CV: Well perfused  Pulm: Nonlabored breathing on RA  Abd: Soft, nondistended. mildly tender to deep palpation. No rebound or guarding.  Extr: No swelling. No calf tenderness bilaterally.  – Spec: Large clots and Bulging membranes seen in vaginal vault.   – VE: Upon removal of clots, membranes were ruptured, clear fluid. Fetus was manually removed, cord avulsed, placenta still attached to uterus. Large clots removed, about ~50ccs total.    Patient declined chaperone    LABS:                        12.5   14.90 )-----------( 265      ( 2025 04:29 )             39.3         136  |  102  |  8   ----------------------------<  111[H]  3.5   |  19[L]  |  0.51    Ca    8.8      2025 04:29    TPro  7.1  /  Alb  3.9  /  TBili  0.1[L]  /  DBili  x   /  AST  12  /  ALT  15  /  AlkPhos  57  -    PT/INR - ( 2025 04:29 )   PT: 11.1 sec;   INR: 0.97 ratio         PTT - ( 2025 04:29 )  PTT:30.2 sec  Urinalysis Basic - ( 2025 04:29 )    Color: x / Appearance: x / SG: x / pH: x  Gluc: 111 mg/dL / Ketone: x  / Bili: x / Urobili: x   Blood: x / Protein: x / Nitrite: x   Leuk Esterase: x / RBC: x / WBC x   Sq Epi: x / Non Sq Epi: x / Bacteria: x        Blood Type: B Positive      RADIOLOGY & ADDITIONAL STUDIES:           HPI:  39y  13w6d (LMP 10/3) presents with abdominal pain and heavy vaginal bleeding. bHCG on 25: . TVUS on 25 showing incomplete  with single IUP EGA of 13w5d located within the lower uterine segment thoracic proximal endocervical canal with absent fetal heart rate. GYN consulted for management of miscarriage.    Patient reports she started experiencing abdominal discomfort described as gas pain around about 8 hrs ago (around 12AM). She subsequently noticed vaginal bleeding around an hour later. She reports soaking about 1-2 thick maxi pads/hr since the bleeding started. She shares that the bleeding has been about the same since it first started. She continues to endorse intermitted abdominal pain and cramping at this time, denies exacerbating factors. She reports alleviation in pain with IV tylenol given in the ED, did not take any OTC pain medications at home. Upon arriving to the ED, patient reported some nausea which she attributes to feeling overwhelmed, which has since resolved. No lightheadedness, dizziness, chest pain, SOB, palpitations, vomiting, fevers, chills, constipation, diarrhea.    OBGYN: Dr. Cam/Dr. Hearn  – OBHx: sAB @6wk in , C/S FT  for Cat II tracing, c/b IUGR.  – GynHx: h/o polyps s/p D&C polypectomy in , denies fibroids, ovarian cysts, endometriosis, abnormal pap smears, STI/STDs  – PMH: hypothyroidism  – PSH: D&C, polypectomy in   – Social: denies smoking, alcohol, drug use  – Meds: PNV, Levothyroxine 100mcg   – Allergies: NKDA    Objective  – VS  T(C): 36.8 (25 @ 06:57)  HR: 69 (25 @ 06:57)  BP: 121/74 (25 @ 06:57)  RR: 18 (25 @ 06:57)  SpO2: 97% (25 @ 06:57)  – PE:   General: Resting comfortably, NAD  CV: Well perfused  Pulm: Nonlabored breathing on RA  Abd: Soft, nondistended. mildly tender to deep palpation. No rebound or guarding.  Extr: No swelling. No calf tenderness bilaterally.  – Spec: Large clots and Bulging membranes seen in vaginal vault.   – VE: Upon removal of clots, membranes were ruptured, clear fluid. Fetus was manually removed, cord avulsed, placenta still attached to uterus. Large clots removed, about ~50ccs total.    Patient declined chaperone    LABS:                        12.5   14.90 )-----------( 265      ( 2025 04:29 )             39.3         136  |  102  |  8   ----------------------------<  111[H]  3.5   |  19[L]  |  0.51    Ca    8.8      2025 04:29    TPro  7.1  /  Alb  3.9  /  TBili  0.1[L]  /  DBili  x   /  AST  12  /  ALT  15  /  AlkPhos  57  01-07    PT/INR - ( 2025 04:29 )   PT: 11.1 sec;   INR: 0.97 ratio         PTT - ( 2025 04:29 )  PTT:30.2 sec  Urinalysis Basic - ( 2025 04:29 )    Color: x / Appearance: x / SG: x / pH: x  Gluc: 111 mg/dL / Ketone: x  / Bili: x / Urobili: x   Blood: x / Protein: x / Nitrite: x   Leuk Esterase: x / RBC: x / WBC x   Sq Epi: x / Non Sq Epi: x / Bacteria: x        Blood Type: B Positive      RADIOLOGY & ADDITIONAL STUDIES:

## 2025-01-07 NOTE — ED PROVIDER NOTE - OBJECTIVE STATEMENT
Is a 39-year-old female, G3, P1, presenting for vaginal bleeding.  Patient was recently seen by an OB/GYN, within the [facility] system, Dr. Chantel Bustos.  Patient had ultrasound done on 24 of December.  Patient's last menstrual period was October 3, and has an estimated due date of July 10, 2025.  Patient is presenting today with vaginal bleeding that started yesterday.  Patient states she also is experiencing lower abdominal cramping and back pain.  Patient states she was bleeding through 1 maxi pad within 45 minutes, but bleeding has slowed down.  Patient has had a history of 1 prior miscarriage, at 6 weeks.  States abdominal pain and back pain have also subsided.  Patient denies any recent fever, chills, nausea, vomiting, diarrhea.

## 2025-01-07 NOTE — ED PROVIDER NOTE - NS ED MD DISPO DISCHARGE
Chief Complaint: _Patient is being seen today by oncology because of his diagnosis of gastric cancer.    Subjective: Patient continues to have complaints of mild abdominal pain      Objective: _    Vitals: Vital Signs (last 24 hrs)_____ Last Charted___________Minimum____________ Maximum____________  Temp    98.4  (APR 28 19:00) 98.4  (APR 28 19:00) H 100.5 (APR 28 15:49)  Heart Rate   90  (APR 28 19:00) 61  (APR 28 15:49) H 134 (APR 28 15:55)  Resp Rate       16  (APR 28 19:00) 16  (APR 28 15:49) 18  (APR 28 07:47)  SBP    133  (APR 28 19:00) 118  (APR 28 15:55) 133  (APR 28 19:00)  DBP    76  (APR 28 19:00) 76  (APR 28 19:00) 83  (APR 28 15:49)    ENT: Has a NJ tube present  Neck: Normal  Respiratory: Lungs clear  CV: Regular  GI: Abdomen distended  Musculoskeletal: Minimal edema  Mental Status: Appropriate    Imaging: Reviewed  Labs: Labs (Last four charted values)  WBC                  5.1 (APR 27) 4.9 (APR 26) 5.2 (APR 24) 4.6 (APR 23)   Hgb                  L 11.2 (APR 27) L 10.6 (APR 26) L 11.2 (APR 24) L 11.7 (APR 23)   Hct                  L 33 (APR 27) L 33 (APR 26) L 34 (APR 24) L 35 (APR 23)   Plt                  L 143 (APR 27) L 146 (APR 26) 193 (APR 24) 204 (APR 23)   Na                   136 (APR 27) 136 (APR 26) L 135 (APR 26) L 135 (APR 25)   K                    4.2 (APR 27) 4.1 (APR 26) 4.0 (APR 26) 4.3 (APR 25)   CO2                  23 (APR 27) 22 (APR 26) 23 (APR 26) 23 (APR 25)   Cl                   107 (APR 27) 105 (APR 26) 107 (APR 26) 106 (APR 25)   Cr                   1.24 (APR 27) 1.29 (APR 26) H 1.44 (APR 26) H 1.37 (APR 25)   BUN                  9 (APR 27) 10 (APR 26) 10 (APR 26) 10 (APR 25)   Glucose              H 158 (APR 27) H 151 (APR 26) H 173 (APR 26) H 152 (APR 25)   Mg                   1.8 (APR 27) 1.8 (APR 21)   Phos                 2.7 (APR 27)   Ca                   8.9 (APR 27) 9.2 (APR 26) 8.8 (APR 26) 9.1 (APR 25)     Assessment: _Patient has metastatic  gastric cancer.  He is getting his current feeds through an NJ tube.  He also had a port placed.    Plan: At this time is to observe him carefully because of his multiple symptoms.  He does not need any transfusions at this time.  When he is stable, we could then proceed to systemic therapy.  His overall prognosis is guarded and poor.       Home

## 2025-01-14 ENCOUNTER — APPOINTMENT (OUTPATIENT)
Dept: OBGYN | Facility: CLINIC | Age: 40
End: 2025-01-14

## 2025-01-14 VITALS
HEIGHT: 64 IN | WEIGHT: 150 LBS | DIASTOLIC BLOOD PRESSURE: 78 MMHG | BODY MASS INDEX: 25.61 KG/M2 | SYSTOLIC BLOOD PRESSURE: 119 MMHG

## 2025-01-14 DIAGNOSIS — O03.4 INCOMPLETE SPONTANEOUS ABORTION W/OUT COMPLICATION: ICD-10-CM

## 2025-01-14 DIAGNOSIS — O02.1 MISSED ABORTION: ICD-10-CM

## 2025-01-14 PROCEDURE — 59812 TREATMENT OF MISCARRIAGE: CPT

## 2025-01-14 PROCEDURE — 99213 OFFICE O/P EST LOW 20 MIN: CPT | Mod: 25

## 2025-01-14 RX ORDER — KETOROLAC TROMETHAMINE 30 MG/ML
30 VIAL (ML) INJECTION
Qty: 1 | Refills: 0 | Status: COMPLETED | OUTPATIENT
Start: 2025-01-14

## 2025-01-14 RX ADMIN — KETOROLAC TROMETHAMINE 0 MG/ML: 30 INJECTION, SOLUTION INTRAMUSCULAR; INTRAVENOUS at 00:00

## 2025-01-22 ENCOUNTER — NON-APPOINTMENT (OUTPATIENT)
Age: 40
End: 2025-01-22

## 2025-01-22 ENCOUNTER — APPOINTMENT (OUTPATIENT)
Dept: OBGYN | Facility: CLINIC | Age: 40
End: 2025-01-22
Payer: COMMERCIAL

## 2025-01-22 VITALS — WEIGHT: 150 LBS | SYSTOLIC BLOOD PRESSURE: 121 MMHG | BODY MASS INDEX: 25.75 KG/M2 | DIASTOLIC BLOOD PRESSURE: 76 MMHG

## 2025-01-22 DIAGNOSIS — O03.9 COMPLETE OR UNSPECIFIED SPONTANEOUS ABORTION W/OUT COMPLICATION: ICD-10-CM

## 2025-01-22 DIAGNOSIS — O03.4 INCOMPLETE SPONTANEOUS ABORTION W/OUT COMPLICATION: ICD-10-CM

## 2025-01-22 LAB
CORE LAB BIOPSY: NORMAL
HCG SERPL-MCNC: 283 MIU/ML

## 2025-01-22 PROCEDURE — 36415 COLL VENOUS BLD VENIPUNCTURE: CPT

## 2025-01-22 PROCEDURE — 99213 OFFICE O/P EST LOW 20 MIN: CPT

## 2025-01-28 LAB
HCG SERPL-MCNC: 41 MIU/ML
T4 FREE SERPL-MCNC: 1.7 NG/DL
TSH SERPL-ACNC: 0.59 UIU/ML

## 2025-02-24 ENCOUNTER — APPOINTMENT (OUTPATIENT)
Dept: OBGYN | Facility: CLINIC | Age: 40
End: 2025-02-24
Payer: COMMERCIAL

## 2025-02-24 DIAGNOSIS — N92.6 IRREGULAR MENSTRUATION, UNSPECIFIED: ICD-10-CM

## 2025-02-24 DIAGNOSIS — O02.1 MISSED ABORTION: ICD-10-CM

## 2025-02-24 DIAGNOSIS — Z31.69 ENCOUNTER FOR OTHER GENERAL COUNSELING AND ADVICE ON PROCREATION: ICD-10-CM

## 2025-02-24 PROCEDURE — 99214 OFFICE O/P EST MOD 30 MIN: CPT | Mod: 95

## 2025-02-27 ENCOUNTER — OUTPATIENT (OUTPATIENT)
Dept: OUTPATIENT SERVICES | Facility: HOSPITAL | Age: 40
LOS: 1 days | End: 2025-02-27
Payer: COMMERCIAL

## 2025-02-27 ENCOUNTER — APPOINTMENT (OUTPATIENT)
Dept: ULTRASOUND IMAGING | Facility: CLINIC | Age: 40
End: 2025-02-27
Payer: COMMERCIAL

## 2025-02-27 DIAGNOSIS — N92.6 IRREGULAR MENSTRUATION, UNSPECIFIED: ICD-10-CM

## 2025-02-27 PROCEDURE — 76856 US EXAM PELVIC COMPLETE: CPT

## 2025-02-27 PROCEDURE — 76830 TRANSVAGINAL US NON-OB: CPT

## 2025-02-27 PROCEDURE — 76856 US EXAM PELVIC COMPLETE: CPT | Mod: 26

## 2025-02-27 PROCEDURE — 76830 TRANSVAGINAL US NON-OB: CPT | Mod: 26

## 2025-03-02 LAB
APTT BLD: 33.1 SEC
B2 GLYCOPROT1 IGA SERPL IA-ACNC: 10 U/ML
B2 GLYCOPROT1 IGG SER-ACNC: <1.4 U/ML
B2 GLYCOPROT1 IGM SER-ACNC: 1.9 U/ML
CARDIOLIPIN IGM SER-MCNC: 2.1 MPL U/ML
CARDIOLIPIN IGM SER-MCNC: <1.6 GPL U/ML
CONFIRM: 27.5 SEC
DEPRECATED CARDIOLIPIN IGA SER: 16.9 APL U/ML
DRVVT IMM 1:2 NP PPP: NORMAL
DRVVT SCREEN TO CONFIRM RATIO: 0.92 RATIO
SCREEN DRVVT: 32.8 SEC
SILICA CLOTTING TIME INTERPRETATION: NORMAL
SILICA CLOTTING TIME S/C: 1.11 RATIO

## 2025-03-06 DIAGNOSIS — D21.9 BENIGN NEOPLASM OF CONNECTIVE AND OTHER SOFT TISSUE, UNSPECIFIED: ICD-10-CM

## 2025-03-06 DIAGNOSIS — N83.209 UNSPECIFIED OVARIAN CYST, UNSPECIFIED SIDE: ICD-10-CM

## 2025-04-17 ENCOUNTER — APPOINTMENT (OUTPATIENT)
Dept: OBGYN | Facility: CLINIC | Age: 40
End: 2025-04-17
Payer: COMMERCIAL

## 2025-04-17 VITALS
SYSTOLIC BLOOD PRESSURE: 131 MMHG | HEIGHT: 64 IN | WEIGHT: 153 LBS | BODY MASS INDEX: 26.12 KG/M2 | DIASTOLIC BLOOD PRESSURE: 84 MMHG

## 2025-04-17 DIAGNOSIS — N91.2 AMENORRHEA, UNSPECIFIED: ICD-10-CM

## 2025-04-17 PROCEDURE — 36415 COLL VENOUS BLD VENIPUNCTURE: CPT

## 2025-04-17 PROCEDURE — 99213 OFFICE O/P EST LOW 20 MIN: CPT | Mod: 25

## 2025-04-17 PROCEDURE — 76817 TRANSVAGINAL US OBSTETRIC: CPT

## 2025-04-18 LAB
C TRACH RRNA SPEC QL NAA+PROBE: NOT DETECTED
HCG SERPL-MCNC: 2358 MIU/ML
N GONORRHOEA RRNA SPEC QL NAA+PROBE: NOT DETECTED
SOURCE AMPLIFICATION: NORMAL
T4 FREE SERPL-MCNC: 1.6 NG/DL
TSH SERPL-ACNC: 1.47 UIU/ML

## 2025-04-30 ENCOUNTER — APPOINTMENT (OUTPATIENT)
Dept: OBGYN | Facility: CLINIC | Age: 40
End: 2025-04-30
Payer: COMMERCIAL

## 2025-04-30 VITALS
DIASTOLIC BLOOD PRESSURE: 82 MMHG | BODY MASS INDEX: 26.29 KG/M2 | WEIGHT: 154 LBS | SYSTOLIC BLOOD PRESSURE: 113 MMHG | HEIGHT: 64 IN

## 2025-04-30 DIAGNOSIS — N91.2 AMENORRHEA, UNSPECIFIED: ICD-10-CM

## 2025-04-30 DIAGNOSIS — O09.91 SUPERVISION OF HIGH RISK PREGNANCY, UNSPECIFIED, FIRST TRIMESTER: ICD-10-CM

## 2025-04-30 PROCEDURE — 76817 TRANSVAGINAL US OBSTETRIC: CPT

## 2025-04-30 PROCEDURE — 36415 COLL VENOUS BLD VENIPUNCTURE: CPT

## 2025-04-30 PROCEDURE — 99213 OFFICE O/P EST LOW 20 MIN: CPT | Mod: 25

## 2025-05-05 LAB
25(OH)D3 SERPL-MCNC: 20.9 NG/ML
ABORH: NORMAL
ANTIBODY SCREEN: NORMAL
B19V IGG SER QL IA: 3.64 INDEX
B19V IGG+IGM SER-IMP: NORMAL
B19V IGG+IGM SER-IMP: POSITIVE
B19V IGM FLD-ACNC: 0.32 INDEX
B19V IGM SER-ACNC: NEGATIVE
BACTERIA UR CULT: NORMAL
BASOPHILS # BLD AUTO: 0.03 K/UL
BASOPHILS NFR BLD AUTO: 0.5 %
EOSINOPHIL # BLD AUTO: 0.04 K/UL
EOSINOPHIL NFR BLD AUTO: 0.7 %
ESTIMATED AVERAGE GLUCOSE: 111 MG/DL
HBA1C MFR BLD HPLC: 5.5 %
HBV CORE IGG+IGM SER QL: NONREACTIVE
HBV SURFACE AB SER QL: REACTIVE
HBV SURFACE AG SER QL: NONREACTIVE
HCT VFR BLD CALC: 39.7 %
HCV AB SER QL: NONREACTIVE
HCV S/CO RATIO: 0.19 S/CO
HGB A MFR BLD: 97.6 %
HGB A2 MFR BLD: 2.4 %
HGB BLD-MCNC: 12.4 G/DL
HGB FRACT BLD-IMP: NORMAL
HIV1+2 AB SPEC QL IA.RAPID: NONREACTIVE
IMM GRANULOCYTES NFR BLD AUTO: 0.4 %
LEAD BLD-MCNC: <1 UG/DL
LYMPHOCYTES # BLD AUTO: 1.22 K/UL
LYMPHOCYTES NFR BLD AUTO: 22.3 %
MAN DIFF?: NORMAL
MCHC RBC-ENTMCNC: 23.6 PG
MCHC RBC-ENTMCNC: 31.2 G/DL
MCV RBC AUTO: 75.6 FL
MEV IGG FLD QL IA: 133 AU/ML
MEV IGG+IGM SER-IMP: POSITIVE
MONOCYTES # BLD AUTO: 0.59 K/UL
MONOCYTES NFR BLD AUTO: 10.8 %
MUV AB SER-ACNC: POSITIVE
MUV IGG SER QL IA: 179 AU/ML
NEUTROPHILS # BLD AUTO: 3.57 K/UL
NEUTROPHILS NFR BLD AUTO: 65.3 %
PLATELET # BLD AUTO: 219 K/UL
RBC # BLD: 5.25 M/UL
RBC # FLD: 17.4 %
RUBV IGG FLD-ACNC: 20.7 INDEX
RUBV IGG SER-IMP: POSITIVE
T PALLIDUM AB SER QL IA: NEGATIVE
TSH SERPL-ACNC: 2.24 UIU/ML
WBC # FLD AUTO: 5.47 K/UL

## 2025-05-14 ENCOUNTER — NON-APPOINTMENT (OUTPATIENT)
Age: 40
End: 2025-05-14

## 2025-05-14 ENCOUNTER — APPOINTMENT (OUTPATIENT)
Dept: OBGYN | Facility: CLINIC | Age: 40
End: 2025-05-14
Payer: COMMERCIAL

## 2025-05-14 VITALS
WEIGHT: 152 LBS | SYSTOLIC BLOOD PRESSURE: 121 MMHG | BODY MASS INDEX: 25.95 KG/M2 | DIASTOLIC BLOOD PRESSURE: 82 MMHG | HEIGHT: 64 IN

## 2025-05-14 DIAGNOSIS — Z11.51 ENCOUNTER FOR SCREENING FOR HUMAN PAPILLOMAVIRUS (HPV): ICD-10-CM

## 2025-05-14 PROCEDURE — 76817 TRANSVAGINAL US OBSTETRIC: CPT

## 2025-05-14 PROCEDURE — 36415 COLL VENOUS BLD VENIPUNCTURE: CPT

## 2025-05-14 PROCEDURE — 99214 OFFICE O/P EST MOD 30 MIN: CPT | Mod: 25

## 2025-05-14 PROCEDURE — 0500F INITIAL PRENATAL CARE VISIT: CPT

## 2025-05-16 LAB — HPV HIGH+LOW RISK DNA PNL CVX: NOT DETECTED

## 2025-05-19 LAB — CYTOLOGY CVX/VAG DOC THIN PREP: NORMAL

## 2025-05-27 ENCOUNTER — NON-APPOINTMENT (OUTPATIENT)
Age: 40
End: 2025-05-27

## 2025-05-29 ENCOUNTER — APPOINTMENT (OUTPATIENT)
Dept: ULTRASOUND IMAGING | Facility: CLINIC | Age: 40
End: 2025-05-29

## 2025-06-02 ENCOUNTER — APPOINTMENT (OUTPATIENT)
Dept: OBGYN | Facility: CLINIC | Age: 40
End: 2025-06-02

## 2025-06-02 ENCOUNTER — NON-APPOINTMENT (OUTPATIENT)
Age: 40
End: 2025-06-02

## 2025-06-05 ENCOUNTER — NON-APPOINTMENT (OUTPATIENT)
Age: 40
End: 2025-06-05

## 2025-06-06 ENCOUNTER — APPOINTMENT (OUTPATIENT)
Dept: OBGYN | Facility: CLINIC | Age: 40
End: 2025-06-06
Payer: COMMERCIAL

## 2025-06-06 PROCEDURE — 36415 COLL VENOUS BLD VENIPUNCTURE: CPT

## 2025-06-06 PROCEDURE — 0502F SUBSEQUENT PRENATAL CARE: CPT

## 2025-06-06 PROCEDURE — 76813 OB US NUCHAL MEAS 1 GEST: CPT | Mod: 59

## 2025-06-06 PROCEDURE — 76801 OB US < 14 WKS SINGLE FETUS: CPT

## 2025-06-06 PROCEDURE — 99213 OFFICE O/P EST LOW 20 MIN: CPT | Mod: 25

## 2025-06-09 LAB
T4 FREE SERPL-MCNC: 1.3 NG/DL
TSH SERPL-ACNC: 1.6 UIU/ML

## 2025-06-16 ENCOUNTER — NON-APPOINTMENT (OUTPATIENT)
Age: 40
End: 2025-06-16

## 2025-06-16 PROBLEM — O28.1 ABNORMAL BIOCHEMICAL FINDING ON ANTENATAL SCREENING OF MOTHER: Status: ACTIVE | Noted: 2025-06-16

## 2025-07-01 ENCOUNTER — APPOINTMENT (OUTPATIENT)
Dept: OBGYN | Facility: CLINIC | Age: 40
End: 2025-07-01

## 2025-07-01 ENCOUNTER — ASOB RESULT (OUTPATIENT)
Age: 40
End: 2025-07-01

## 2025-07-01 ENCOUNTER — APPOINTMENT (OUTPATIENT)
Dept: MATERNAL FETAL MEDICINE | Facility: CLINIC | Age: 40
End: 2025-07-01
Payer: COMMERCIAL

## 2025-07-01 ENCOUNTER — APPOINTMENT (OUTPATIENT)
Dept: ANTEPARTUM | Facility: CLINIC | Age: 40
End: 2025-07-01
Payer: COMMERCIAL

## 2025-07-01 PROCEDURE — ZZZZZ: CPT

## 2025-07-01 PROCEDURE — 76805 OB US >/= 14 WKS SNGL FETUS: CPT

## 2025-07-01 PROCEDURE — 99214 OFFICE O/P EST MOD 30 MIN: CPT | Mod: 95

## 2025-07-07 ENCOUNTER — APPOINTMENT (OUTPATIENT)
Dept: OBGYN | Facility: CLINIC | Age: 40
End: 2025-07-07
Payer: COMMERCIAL

## 2025-07-07 PROBLEM — Z34.92 SECOND TRIMESTER PREGNANCY: Status: ACTIVE | Noted: 2025-07-07

## 2025-07-07 PROCEDURE — 0502F SUBSEQUENT PRENATAL CARE: CPT

## 2025-07-07 PROCEDURE — 36415 COLL VENOUS BLD VENIPUNCTURE: CPT

## 2025-07-07 PROCEDURE — 99213 OFFICE O/P EST LOW 20 MIN: CPT | Mod: 25

## 2025-07-11 LAB
2ND TRIMESTER 4 SCREEN SERPL-IMP: NO
AFP ADJ MOM SERPL: 0.85
AFP INTERP SERPL-IMP: NORMAL
AFP INTERP SERPL-IMP: NORMAL
AFP MOM CUT-OFF: 2.5
AFP PERCENTILE: 30.4
AFP SERPL-ACNC: 30.55 NG/ML
CARBAMAZEPINE?: NO
CURRENT SMOKER: NORMAL
DIABETES STATUS PATIENT: NORMAL
GA: NORMAL
GESTATIONAL AGE METHOD: NORMAL
HX OF NTD NARR: NORMAL
MULTIPLE PREGNANCY: NORMAL
NEURAL TUBE DEFECT RISK FETUS: NORMAL
NEURAL TUBE DEFECT RISK POP: NORMAL
TEST PERFORMANCE INFO SPEC: NORMAL
TSH SERPL-ACNC: 2.34 UIU/ML
VALPROIC ACID?: NORMAL

## 2025-07-15 ENCOUNTER — TRANSCRIPTION ENCOUNTER (OUTPATIENT)
Age: 40
End: 2025-07-15

## 2025-07-28 ENCOUNTER — TRANSCRIPTION ENCOUNTER (OUTPATIENT)
Age: 40
End: 2025-07-28

## 2025-07-28 ENCOUNTER — INPATIENT (INPATIENT)
Facility: HOSPITAL | Age: 40
LOS: 0 days | Discharge: HOME | DRG: 770 | End: 2025-07-28
Attending: OBSTETRICS & GYNECOLOGY | Admitting: OBSTETRICS & GYNECOLOGY
Payer: COMMERCIAL

## 2025-07-28 ENCOUNTER — EMERGENCY (EMERGENCY)
Facility: HOSPITAL | Age: 40
LOS: 1 days | End: 2025-07-28
Attending: STUDENT IN AN ORGANIZED HEALTH CARE EDUCATION/TRAINING PROGRAM
Payer: COMMERCIAL

## 2025-07-28 VITALS
OXYGEN SATURATION: 100 % | SYSTOLIC BLOOD PRESSURE: 118 MMHG | HEART RATE: 102 BPM | RESPIRATION RATE: 18 BRPM | DIASTOLIC BLOOD PRESSURE: 76 MMHG

## 2025-07-28 VITALS
HEART RATE: 90 BPM | TEMPERATURE: 98 F | OXYGEN SATURATION: 98 % | DIASTOLIC BLOOD PRESSURE: 69 MMHG | SYSTOLIC BLOOD PRESSURE: 132 MMHG

## 2025-07-28 VITALS
SYSTOLIC BLOOD PRESSURE: 126 MMHG | RESPIRATION RATE: 18 BRPM | HEART RATE: 99 BPM | DIASTOLIC BLOOD PRESSURE: 67 MMHG | OXYGEN SATURATION: 99 % | TEMPERATURE: 98 F

## 2025-07-28 VITALS
DIASTOLIC BLOOD PRESSURE: 58 MMHG | HEART RATE: 76 BPM | SYSTOLIC BLOOD PRESSURE: 110 MMHG | TEMPERATURE: 98 F | RESPIRATION RATE: 16 BRPM | OXYGEN SATURATION: 100 %

## 2025-07-28 DIAGNOSIS — Z34.80 ENCOUNTER FOR SUPERVISION OF OTHER NORMAL PREGNANCY, UNSPECIFIED TRIMESTER: ICD-10-CM

## 2025-07-28 DIAGNOSIS — O26.899 OTHER SPECIFIED PREGNANCY RELATED CONDITIONS, UNSPECIFIED TRIMESTER: ICD-10-CM

## 2025-07-28 LAB
ADD ON TEST-SPECIMEN IN LAB: SIGNIFICANT CHANGE UP
ALBUMIN SERPL ELPH-MCNC: 3.6 G/DL — SIGNIFICANT CHANGE UP (ref 3.3–5)
ALP SERPL-CCNC: 62 U/L — SIGNIFICANT CHANGE UP (ref 40–120)
ALT FLD-CCNC: 14 U/L — SIGNIFICANT CHANGE UP (ref 10–45)
ANION GAP SERPL CALC-SCNC: 13 MMOL/L — SIGNIFICANT CHANGE UP (ref 5–17)
APTT BLD: 27.2 SEC — SIGNIFICANT CHANGE UP (ref 26.1–36.8)
AST SERPL-CCNC: 13 U/L — SIGNIFICANT CHANGE UP (ref 10–40)
BASOPHILS # BLD AUTO: 0.06 K/UL — SIGNIFICANT CHANGE UP (ref 0–0.2)
BASOPHILS NFR BLD AUTO: 0.4 % — SIGNIFICANT CHANGE UP (ref 0–2)
BILIRUB SERPL-MCNC: <0.1 MG/DL — LOW (ref 0.2–1.2)
BLD GP AB SCN SERPL QL: NEGATIVE — SIGNIFICANT CHANGE UP
BUN SERPL-MCNC: 5 MG/DL — LOW (ref 7–23)
CALCIUM SERPL-MCNC: 8.7 MG/DL — SIGNIFICANT CHANGE UP (ref 8.4–10.5)
CHLORIDE SERPL-SCNC: 106 MMOL/L — SIGNIFICANT CHANGE UP (ref 96–108)
CO2 SERPL-SCNC: 19 MMOL/L — LOW (ref 22–31)
CREAT SERPL-MCNC: 0.53 MG/DL — SIGNIFICANT CHANGE UP (ref 0.5–1.3)
EGFR: 121 ML/MIN/1.73M2 — SIGNIFICANT CHANGE UP
EGFR: 121 ML/MIN/1.73M2 — SIGNIFICANT CHANGE UP
EOSINOPHIL # BLD AUTO: 0.09 K/UL — SIGNIFICANT CHANGE UP (ref 0–0.5)
EOSINOPHIL NFR BLD AUTO: 0.6 % — SIGNIFICANT CHANGE UP (ref 0–6)
GLUCOSE SERPL-MCNC: 132 MG/DL — HIGH (ref 70–99)
HCT VFR BLD CALC: 34.9 % — SIGNIFICANT CHANGE UP (ref 34.5–45)
HGB BLD-MCNC: 10.9 G/DL — LOW (ref 11.5–15.5)
IMM GRANULOCYTES # BLD AUTO: 0.16 K/UL — HIGH (ref 0–0.07)
IMM GRANULOCYTES NFR BLD AUTO: 1 % — HIGH (ref 0–0.9)
INR BLD: 0.93 RATIO — SIGNIFICANT CHANGE UP (ref 0.85–1.16)
LYMPHOCYTES # BLD AUTO: 1.35 K/UL — SIGNIFICANT CHANGE UP (ref 1–3.3)
LYMPHOCYTES NFR BLD AUTO: 8.5 % — LOW (ref 13–44)
MCHC RBC-ENTMCNC: 25 PG — LOW (ref 27–34)
MCHC RBC-ENTMCNC: 31.2 G/DL — LOW (ref 32–36)
MCV RBC AUTO: 80 FL — SIGNIFICANT CHANGE UP (ref 80–100)
MONOCYTES # BLD AUTO: 0.78 K/UL — SIGNIFICANT CHANGE UP (ref 0–0.9)
MONOCYTES NFR BLD AUTO: 4.9 % — SIGNIFICANT CHANGE UP (ref 2–14)
NEUTROPHILS # BLD AUTO: 13.43 K/UL — HIGH (ref 1.8–7.4)
NEUTROPHILS NFR BLD AUTO: 84.6 % — HIGH (ref 43–77)
NRBC # BLD AUTO: 0 K/UL — SIGNIFICANT CHANGE UP (ref 0–0)
NRBC # FLD: 0 K/UL — SIGNIFICANT CHANGE UP (ref 0–0)
NRBC BLD AUTO-RTO: 0 /100 WBCS — SIGNIFICANT CHANGE UP (ref 0–0)
PLATELET # BLD AUTO: 238 K/UL — SIGNIFICANT CHANGE UP (ref 150–400)
PMV BLD: 10.7 FL — SIGNIFICANT CHANGE UP (ref 7–13)
POTASSIUM SERPL-MCNC: 3.5 MMOL/L — SIGNIFICANT CHANGE UP (ref 3.5–5.3)
POTASSIUM SERPL-SCNC: 3.5 MMOL/L — SIGNIFICANT CHANGE UP (ref 3.5–5.3)
PROT SERPL-MCNC: 6.7 G/DL — SIGNIFICANT CHANGE UP (ref 6–8.3)
PROTHROM AB SERPL-ACNC: 10.7 SEC — SIGNIFICANT CHANGE UP (ref 9.9–13.4)
RBC # BLD: 4.36 M/UL — SIGNIFICANT CHANGE UP (ref 3.8–5.2)
RBC # FLD: 16.5 % — HIGH (ref 10.3–14.5)
RH IG SCN BLD-IMP: POSITIVE — SIGNIFICANT CHANGE UP
SODIUM SERPL-SCNC: 138 MMOL/L — SIGNIFICANT CHANGE UP (ref 135–145)
WBC # BLD: 15.87 K/UL — HIGH (ref 3.8–10.5)
WBC # FLD AUTO: 15.87 K/UL — HIGH (ref 3.8–10.5)

## 2025-07-28 PROCEDURE — 36415 COLL VENOUS BLD VENIPUNCTURE: CPT

## 2025-07-28 PROCEDURE — 86146 BETA-2 GLYCOPROTEIN ANTIBODY: CPT

## 2025-07-28 PROCEDURE — 85730 THROMBOPLASTIN TIME PARTIAL: CPT

## 2025-07-28 PROCEDURE — 86901 BLOOD TYPING SEROLOGIC RH(D): CPT

## 2025-07-28 PROCEDURE — 84295 ASSAY OF SERUM SODIUM: CPT

## 2025-07-28 PROCEDURE — 88305 TISSUE EXAM BY PATHOLOGIST: CPT | Mod: 26

## 2025-07-28 PROCEDURE — 99283 EMERGENCY DEPT VISIT LOW MDM: CPT

## 2025-07-28 PROCEDURE — 82330 ASSAY OF CALCIUM: CPT

## 2025-07-28 PROCEDURE — 82435 ASSAY OF BLOOD CHLORIDE: CPT

## 2025-07-28 PROCEDURE — 85018 HEMOGLOBIN: CPT

## 2025-07-28 PROCEDURE — 85014 HEMATOCRIT: CPT

## 2025-07-28 PROCEDURE — 85610 PROTHROMBIN TIME: CPT

## 2025-07-28 PROCEDURE — 80053 COMPREHEN METABOLIC PANEL: CPT

## 2025-07-28 PROCEDURE — 59821 TREATMENT OF MISCARRIAGE: CPT

## 2025-07-28 PROCEDURE — 85613 RUSSELL VIPER VENOM DILUTED: CPT

## 2025-07-28 PROCEDURE — 59414 DELIVER PLACENTA: CPT

## 2025-07-28 PROCEDURE — 86850 RBC ANTIBODY SCREEN: CPT

## 2025-07-28 PROCEDURE — 85025 COMPLETE CBC W/AUTO DIFF WBC: CPT

## 2025-07-28 PROCEDURE — 86147 CARDIOLIPIN ANTIBODY EA IG: CPT

## 2025-07-28 PROCEDURE — 82803 BLOOD GASES ANY COMBINATION: CPT

## 2025-07-28 PROCEDURE — 86900 BLOOD TYPING SEROLOGIC ABO: CPT

## 2025-07-28 PROCEDURE — 83605 ASSAY OF LACTIC ACID: CPT

## 2025-07-28 PROCEDURE — 99284 EMERGENCY DEPT VISIT MOD MDM: CPT

## 2025-07-28 PROCEDURE — 82947 ASSAY GLUCOSE BLOOD QUANT: CPT

## 2025-07-28 PROCEDURE — 85384 FIBRINOGEN ACTIVITY: CPT

## 2025-07-28 PROCEDURE — 84132 ASSAY OF SERUM POTASSIUM: CPT

## 2025-07-28 RX ORDER — IBUPROFEN 200 MG
600 TABLET ORAL EVERY 6 HOURS
Refills: 0 | Status: DISCONTINUED | OUTPATIENT
Start: 2025-07-28 | End: 2025-07-28

## 2025-07-28 RX ORDER — DIPHENOXYLATE HYDROCHLORIDE AND ATROPINE SULFATE .025; 2.5 MG/1; MG/1
2 TABLET ORAL ONCE
Refills: 0 | Status: DISCONTINUED | OUTPATIENT
Start: 2025-07-28 | End: 2025-07-28

## 2025-07-28 RX ORDER — OXYCODONE HYDROCHLORIDE 30 MG/1
5 TABLET ORAL ONCE
Refills: 0 | Status: DISCONTINUED | OUTPATIENT
Start: 2025-07-28 | End: 2025-07-28

## 2025-07-28 RX ORDER — ONDANSETRON HCL/PF 4 MG/2 ML
4 VIAL (ML) INJECTION ONCE
Refills: 0 | Status: COMPLETED | OUTPATIENT
Start: 2025-07-28 | End: 2025-07-28

## 2025-07-28 RX ORDER — OXYCODONE HYDROCHLORIDE 30 MG/1
5 TABLET ORAL
Refills: 0 | Status: DISCONTINUED | OUTPATIENT
Start: 2025-07-28 | End: 2025-07-28

## 2025-07-28 RX ORDER — CARBOPROST TROMETHAMINE 250 UG/ML
250 INJECTION, SOLUTION INTRAMUSCULAR
Refills: 0 | Status: COMPLETED | OUTPATIENT
Start: 2025-07-28 | End: 2025-07-28

## 2025-07-28 RX ORDER — ACETAMINOPHEN 500 MG/5ML
975 LIQUID (ML) ORAL
Refills: 0 | Status: DISCONTINUED | OUTPATIENT
Start: 2025-07-28 | End: 2025-07-28

## 2025-07-28 RX ORDER — KETOROLAC TROMETHAMINE 30 MG/ML
30 INJECTION, SOLUTION INTRAMUSCULAR; INTRAVENOUS ONCE
Refills: 0 | Status: DISCONTINUED | OUTPATIENT
Start: 2025-07-28 | End: 2025-07-28

## 2025-07-28 RX ORDER — LEVOTHYROXINE SODIUM 300 MCG
100 TABLET ORAL DAILY
Refills: 0 | Status: DISCONTINUED | OUTPATIENT
Start: 2025-07-28 | End: 2025-07-28

## 2025-07-28 RX ORDER — OXYTOCIN-SODIUM CHLORIDE 0.9% IV SOLN 30 UNIT/500ML 30-0.9/5 UT/ML-%
167 SOLUTION INTRAVENOUS
Qty: 30 | Refills: 0 | Status: DISCONTINUED | OUTPATIENT
Start: 2025-07-28 | End: 2025-07-28

## 2025-07-28 RX ORDER — SODIUM CHLORIDE 9 G/1000ML
1000 INJECTION, SOLUTION INTRAVENOUS ONCE
Refills: 0 | Status: DISCONTINUED | OUTPATIENT
Start: 2025-07-28 | End: 2025-07-28

## 2025-07-28 RX ADMIN — CARBOPROST TROMETHAMINE 250 MICROGRAM(S): 250 INJECTION, SOLUTION INTRAMUSCULAR at 04:10

## 2025-07-28 RX ADMIN — DIPHENOXYLATE HYDROCHLORIDE AND ATROPINE SULFATE 2 TABLET(S): .025; 2.5 TABLET ORAL at 04:08

## 2025-07-28 RX ADMIN — Medication 4 MILLIGRAM(S): at 04:45

## 2025-07-28 RX ADMIN — Medication 4 MILLIGRAM(S): at 06:19

## 2025-07-28 RX ADMIN — CARBOPROST TROMETHAMINE 250 MICROGRAM(S): 250 INJECTION, SOLUTION INTRAMUSCULAR at 04:26

## 2025-07-28 RX ADMIN — KETOROLAC TROMETHAMINE 30 MILLIGRAM(S): 30 INJECTION, SOLUTION INTRAMUSCULAR; INTRAVENOUS at 06:19

## 2025-07-28 RX ADMIN — CARBOPROST TROMETHAMINE 250 MICROGRAM(S): 250 INJECTION, SOLUTION INTRAMUSCULAR at 04:41
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
## 2025-07-28 NOTE — ED PROVIDER NOTE - CLINICAL SUMMARY MEDICAL DECISION MAKING FREE TEXT BOX
Korey EVANGELISTA), PGY-3:  This is a 38y/o F pt A2 (2 previous miscarrages at 6wks and 13wks), presenting for evaluation of miscarriage.  Patient 6 days pregnant today, had fetal demise and delivered fetus at home tonight at midnight.  Patient states she had small amount of pinkish bleeding this morning, then took a flight home from a vacation in [place] this evening, shortly after getting home at approx. 12AM she felt some pressure as if she was going to have a bowel movement, then while in the bathroom delivered the fetus.  Patient called 911, they clamped and cut the cord, patient brought in fetal tissue with her to the ER.  Patient has not yet delivered the placenta.    Patient well-appearing in the ER, vital signs stable and nonactionable in triage, abdomen soft nontender, external pelvic exam with no active hemorrhage, umbilical cord protruding from vagina.  Spoke with GYN, will move patient up to labor and delivery for continued management.

## 2025-07-28 NOTE — ED PROVIDER NOTE - PHYSICAL EXAMINATION
Const: Awake, alert, no acute distress.  Well appearing.  Moving comfortably on stretcher.  HEENT: NC/AT.  Moist mucous membranes.  No pharyngeal erythema, no exudates.  Eyes: Extraocular movements intact b/l.  Pupils equal, round, and reactive to light b/l.  Conjunctiva pink.  No scleral icterus.  Neck: Neck supple, full ROM without pain.  Cardiac: Regular rate and regular rhythm. S1 S2 present.  Peripheral pulses 2+ and symmetric.  No LE edema.  No chest wall tenderness, no overlying skin changes.  Resp: Speaking in full sentences. No evidence of respiratory distress.  Good air entry b/l, breath sounds clear to auscultation b/l.  Abd: Non-distended, no overlying skin changes.  Soft, non-tender, no guarding, no rigidity, no rebound tenderness.  No palpable masses.  Pelvic: brief external exam performed, no active hemorrhage however underwear/pad soaked with blood, clamped umbilical cord protruding from vagina.  comprehensive exam not performed because pt transferred to L&D.  MSK: Spine midline and non-tender, no paraspinal tenderness, no palpable deformities or overlying skin changes or open wounds. No CVAT.  Skin: Normal coloration.  No rashes, abrasions or lacerations.  Neuro: Awake, alert & oriented x 3.  Moves all extremities spontaneously and symmetrically.  No focal deficits.

## 2025-07-28 NOTE — ED ADULT NURSE NOTE - OBJECTIVE STATEMENT
40y/o F, , hypothyroidism, presents to the ED for miscarriage at 19weeks, from home, BIBEMS.  pt has fetus with her at bed side in basin. pt states around midnight, she felt the urge to defecate, and passed fetus along w/ umbilical cord, EMS called and cut cord prior to arrival. pt currently endorses abd and vaginal discomfort. denies cp, sob, dizziness, NV, weakness, numbness / tingling, lightheadedness. pt has IV from EMS, L 20g AC.

## 2025-07-28 NOTE — ED ADULT NURSE NOTE - NSFALLUNIVINTERV_ED_ALL_ED
Bed/Stretcher in lowest position, wheels locked, appropriate side rails in place/Call bell, personal items and telephone in reach/Instruct patient to call for assistance before getting out of bed/chair/stretcher/Non-slip footwear applied when patient is off stretcher/[place] to call system/Physically safe environment - no spills, clutter or unnecessary equipment/Purposeful proactive rounding/Room/bathroom lighting operational, light cord in reach O-T Advancement Flap Text: The defect edges were debeveled with a #15 scalpel blade.  Given the location of the defect, shape of the defect and the proximity to free margins an O-T advancement flap was deemed most appropriate.  Using a sterile surgical marker, an appropriate advancement flap was drawn incorporating the defect and placing the expected incisions within the relaxed skin tension lines where possible.    The area thus outlined was incised deep to adipose tissue with a #15 scalpel blade.  The skin margins were undermined to an appropriate distance in all directions utilizing iris scissors.

## 2025-07-29 LAB
B2 GLYCOPROT1 IGA SER QL: 4.5 U/ML — SIGNIFICANT CHANGE UP
B2 GLYCOPROT1 IGG SER-ACNC: <1.4 U/ML — SIGNIFICANT CHANGE UP
B2 GLYCOPROT1 IGM SER-ACNC: <1.5 U/ML — SIGNIFICANT CHANGE UP
CARDIOLIPIN IGM SER-MCNC: <1.5 MPL U/ML — SIGNIFICANT CHANGE UP
CARDIOLIPIN IGM SER-MCNC: <1.6 GPL U/ML — SIGNIFICANT CHANGE UP
DEPRECATED CARDIOLIPIN IGA SER: 6.5 APL U/ML — SIGNIFICANT CHANGE UP
DRVVT RATIO: 0.89 RATIO — SIGNIFICANT CHANGE UP (ref 0–1.21)
DRVVT SCREEN TO CONFIRM RATIO: SIGNIFICANT CHANGE UP
NORMALIZED SCT PPP-RTO: 1.01 RATIO — SIGNIFICANT CHANGE UP (ref 0–1.16)
NORMALIZED SCT PPP-RTO: SIGNIFICANT CHANGE UP

## 2025-07-30 ENCOUNTER — APPOINTMENT (OUTPATIENT)
Dept: OBGYN | Facility: CLINIC | Age: 40
End: 2025-07-30

## 2025-08-01 ENCOUNTER — APPOINTMENT (OUTPATIENT)
Dept: ANTEPARTUM | Facility: CLINIC | Age: 40
End: 2025-08-01

## 2025-08-04 ENCOUNTER — APPOINTMENT (OUTPATIENT)
Dept: OBGYN | Facility: CLINIC | Age: 40
End: 2025-08-04

## 2025-08-12 ENCOUNTER — APPOINTMENT (OUTPATIENT)
Dept: OBGYN | Facility: CLINIC | Age: 40
End: 2025-08-12

## 2025-08-12 VITALS
WEIGHT: 153 LBS | BODY MASS INDEX: 26.12 KG/M2 | SYSTOLIC BLOOD PRESSURE: 113 MMHG | DIASTOLIC BLOOD PRESSURE: 71 MMHG | HEIGHT: 64 IN

## 2025-08-12 PROCEDURE — 0503F POSTPARTUM CARE VISIT: CPT

## 2025-08-14 LAB — CHROMOSOME ANALYSIS PRODUCTS OF CONCEPTION FINAL REPORT: SIGNIFICANT CHANGE UP

## 2025-08-18 LAB — SURGICAL PATHOLOGY STUDY: SIGNIFICANT CHANGE UP

## 2025-08-27 ENCOUNTER — APPOINTMENT (OUTPATIENT)
Dept: OBGYN | Facility: CLINIC | Age: 40
End: 2025-08-27